# Patient Record
Sex: FEMALE | Race: WHITE | NOT HISPANIC OR LATINO | Employment: OTHER | URBAN - METROPOLITAN AREA
[De-identification: names, ages, dates, MRNs, and addresses within clinical notes are randomized per-mention and may not be internally consistent; named-entity substitution may affect disease eponyms.]

---

## 2018-09-18 ENCOUNTER — OFFICE VISIT (OUTPATIENT)
Dept: ORTHOPEDIC SURGERY | Facility: CLINIC | Age: 73
End: 2018-09-18

## 2018-09-18 VITALS — HEIGHT: 63 IN | TEMPERATURE: 98.2 F | WEIGHT: 20.2 LBS | BODY MASS INDEX: 3.58 KG/M2

## 2018-09-18 DIAGNOSIS — M25.561 CHRONIC PAIN OF BOTH KNEES: Primary | ICD-10-CM

## 2018-09-18 DIAGNOSIS — G89.29 CHRONIC PAIN OF BOTH KNEES: Primary | ICD-10-CM

## 2018-09-18 DIAGNOSIS — M17.11 PRIMARY OSTEOARTHRITIS OF RIGHT KNEE: ICD-10-CM

## 2018-09-18 DIAGNOSIS — M25.562 CHRONIC PAIN OF BOTH KNEES: Primary | ICD-10-CM

## 2018-09-18 PROCEDURE — 73562 X-RAY EXAM OF KNEE 3: CPT | Performed by: ORTHOPAEDIC SURGERY

## 2018-09-18 PROCEDURE — 99204 OFFICE O/P NEW MOD 45 MIN: CPT | Performed by: ORTHOPAEDIC SURGERY

## 2018-09-18 RX ORDER — CEFAZOLIN SODIUM 2 G/100ML
2 INJECTION, SOLUTION INTRAVENOUS ONCE
Status: CANCELLED | OUTPATIENT
Start: 2018-11-12 | End: 2018-11-12

## 2018-09-18 RX ORDER — LETROZOLE 2.5 MG/1
TABLET, FILM COATED ORAL
COMMUNITY
Start: 2018-09-13 | End: 2018-11-01

## 2018-09-18 RX ORDER — MELATONIN
1000 DAILY
COMMUNITY
End: 2018-11-13 | Stop reason: HOSPADM

## 2018-09-18 RX ORDER — VALSARTAN AND HYDROCHLOROTHIAZIDE 80; 12.5 MG/1; MG/1
1 TABLET, FILM COATED ORAL DAILY
COMMUNITY
Start: 2018-08-30

## 2018-09-18 RX ORDER — ACETAMINOPHEN 500 MG
1000 TABLET ORAL EVERY 6 HOURS PRN
COMMUNITY

## 2018-09-18 RX ORDER — PREGABALIN 75 MG/1
150 CAPSULE ORAL ONCE
Status: CANCELLED | OUTPATIENT
Start: 2018-11-12 | End: 2018-11-12

## 2018-09-18 RX ORDER — MELOXICAM 15 MG/1
15 TABLET ORAL ONCE
Status: CANCELLED | OUTPATIENT
Start: 2018-11-12 | End: 2018-11-12

## 2018-09-18 RX ORDER — ROSUVASTATIN CALCIUM 10 MG/1
TABLET, COATED ORAL
COMMUNITY
Start: 2018-04-25 | End: 2018-11-01

## 2018-09-18 RX ORDER — ASPIRIN 81 MG/1
81 TABLET ORAL DAILY
COMMUNITY
End: 2018-11-13 | Stop reason: HOSPADM

## 2018-09-18 NOTE — PROGRESS NOTES
Patient: Komal Sheffield  YOB: 1945 72 y.o. female  Medical Record Number: [id number]    Chief Complaints:   Chief Complaint   Patient presents with   • Left Knee - Establish Care, Pain   • Right Knee - Establish Care, Pain       History of Present Illness:Komal Sheffield is a 72 y.o. female who presents with right greater than left knee pain which is severe in nature.  She describes a stabbing aching pain on the medial joint has been present for 5 years.  She has seen Dr. Guthrie for treatment of her knees up until now.  She is undergone multiple injections, physical therapy, anti-inflammatories without relief of symptoms.  She has pain which limits her basic activities of daily living and has a very short walking tolerance.    Allergies:   Allergies   Allergen Reactions   • Cholestatin Unknown (See Comments)     Pt is unsure of reaction, dr told her to never eat it.   • Ciprofloxacin Itching   • Neomycin Itching   • Nsaids Unknown (See Comments)     High cr       Medications:   Current Outpatient Prescriptions   Medication Sig Dispense Refill   • acetaminophen (TYLENOL) 500 MG tablet Take 500 mg by mouth Every 6 (Six) Hours As Needed for Mild Pain .     • aspirin 81 MG EC tablet Take 81 mg by mouth Daily.     • cholecalciferol (VITAMIN D3) 1000 units tablet Take 1,000 Units by mouth Daily.     • letrozole (FEMARA) 2.5 MG tablet TAKE 1 TABLET BY MOUTH DAILY     • metFORMIN (GLUCOPHAGE) 1000 MG tablet      • rosuvastatin (CRESTOR) 10 MG tablet TAKE 1 TABLET BY MOUTH EVERY DAY     • sertraline (ZOLOFT) 50 MG tablet TAKE 1 TABLET BY MOUTH EVERY DAY     • valsartan-hydrochlorothiazide (DIOVAN-HCT) 80-12.5 MG per tablet      • sertraline (ZOLOFT) 50 MG tablet        No current facility-administered medications for this visit.          The following portions of the patient's history were reviewed and updated as appropriate: allergies, current medications, past family history, past medical history, past social  "history, past surgical history and problem list.    Review of Systems:   A 14 point review of systems was performed. All systems negative except pertinent positives/negative listed in HPI above    Physical Exam:   Vitals:    09/18/18 1533   Temp: 98.2 °F (36.8 °C)   TempSrc: Temporal Artery    Weight: 9.163 kg (20 lb 3.2 oz)   Height: 160 cm (63\")   PainSc: 10-Worst pain ever  Comment: walking   PainLoc: Knee  Comment: bilateral       General: A and O x 3, ASA, NAD    SCLERA:    Normal    DENTITION:   Normal  Knee:  right    ALIGNMENT:     Varus  ,   Patella  tracks  midline    GAIT:    Antalgic    SKIN:    No abnormality    RANGE OF MOTION:   7  -  120   DEG    STRENGTH:   4  / 5    LIGAMENTS:    No varus / valgus instability.   Negative  Lachman.    MENISCUS:     Negative   Tiki       DISTAL PULSES:    Paplable    DISTAL SENSATION :   Intact    LYMPHATICS:     No   lymphadenopathy    OTHER:          - Positive   effusion      - Crepitance with ROM          Radiology:  Xrays 3views both knees (ap,lateral, sunrise) were ordered and reviewed for evaluation of knee pain demonstrating advanced varus osteoarthritis with bone on bone articulation, subchondral cysts, and periarticular osteophytes. There are no previous films for comparision.    Assessment/Plan: Right greater than left knee end-stage advanced osteoarthritis.  She has failed the full complement of conservative measures.  Continuation of conservative management vs. TKA discussed.  The patient wishes to proceed with total knee replacement.  At this point the patient has failed the full compliment of conservative treatment and stating complete understanding of the risks/benefits/ anternatives wishes to proceed with surgical treatment.    Risk and benefits of surgery were reviewed.  Including, but not limited to, blood clots or pulmonary embolism, anesthesia risk, infection, fracture, skin/leg numbness, persistent pain/crepitance/popping/catching, failure " of the implant, need for future surgeries, hematoma, possible nerve or blood vessel injury, need for transfusion, and potential risk of stroke,heart attack or death, among others.  The patient understands and wishes to proceed.     It was explained that if tissue has been repaired or reconstructed, there is also an increased chance of failure which may require further management.  Following the completion of the discussion, the patient expressed understanding of this planned course of care, all their questions were answered and consent will be obtained preoperatively.    Operative Plan: right [employer] Total Knee Replacement an overnight staywith home health rehab    BMI 35      Valerio Barakat MD  9/18/2018

## 2018-09-27 PROBLEM — M17.11 PRIMARY OSTEOARTHRITIS OF RIGHT KNEE: Status: ACTIVE | Noted: 2018-09-27

## 2018-09-28 ENCOUNTER — TREATMENT (OUTPATIENT)
Dept: PHYSICAL THERAPY | Facility: CLINIC | Age: 73
End: 2018-09-28

## 2018-09-28 DIAGNOSIS — R26.2 DIFFICULTY WALKING: ICD-10-CM

## 2018-09-28 DIAGNOSIS — M17.11 PRIMARY OSTEOARTHRITIS OF RIGHT KNEE: Primary | ICD-10-CM

## 2018-09-28 PROCEDURE — G8979 MOBILITY GOAL STATUS: HCPCS | Performed by: PHYSICAL THERAPIST

## 2018-09-28 PROCEDURE — 97110 THERAPEUTIC EXERCISES: CPT | Performed by: PHYSICAL THERAPIST

## 2018-09-28 PROCEDURE — G8978 MOBILITY CURRENT STATUS: HCPCS | Performed by: PHYSICAL THERAPIST

## 2018-09-28 PROCEDURE — 97161 PT EVAL LOW COMPLEX 20 MIN: CPT | Performed by: PHYSICAL THERAPIST

## 2018-09-28 NOTE — PROGRESS NOTES
Physical Therapy Initial Evaluation and Plan of Care    Patient: Komal Sheffield   : 1945  Diagnosis/ICD-10 Code:  Primary osteoarthritis of right knee [M17.11]  Referring practitioner: Valerio Barakat MD    Subjective Evaluation    History of Present Illness  Onset date: 3 years ago, but increased in the last year.  Mechanism of injury: Pt is a 74 y/o WF who reports to the clinic with c/o B knee pain R >L for approximately 3 years.  Pt has received cortisone and gel injections with the most recent one year ago.  Pt reports she has the most discomfort when she is walking.  Pt reports she has had progressive knee pain for the last year and finally decided to have the TKR.     Subjective comment: Pt is scheduled to have her right knee replaced on 2018Quality of life: good    Pain  Current pain ratin  At worst pain ratin  Location: right medial knee joint   Quality: burning  Relieving factors: medications, change in position, rest and support (sitting, Tylenol)  Aggravating factors: ambulation and standing    Hand dominance: right    Diagnostic Tests  X-ray: abnormal (bone on bone right knee )    Treatments  Previous treatment: injection treatment and medication  Current treatment: medication  Patient Goals  Patient goals for therapy: decreased pain, increased strength and increased motion  Patient goal: increase strength prior to TKR on 2018           Objective       Palpation     Additional Palpation Details  Right lateral and medial knee joint line, right proximal medial tibia     Neurological Testing     Sensation     Knee   Left Knee   Intact: light touch    Right Knee   Intact: light touch     Active Range of Motion   Left Knee   Flexion: 120 degrees   Extension: 12 degrees     Right Knee   Flexion: 108 degrees   Extension: 12 degrees     Patellar Mobility   Left Knee Hypomobile in the left medial, left lateral, left superior and left inferior patellar tendon(s).     Right Knee  Hypomobile in the medial, lateral, superior and inferior patellar tendon(s).     Strength/Myotome Testing     Left Hip   Planes of Motion   Flexion: 5  Abduction: 5  Adduction: 4    Right Hip   Planes of Motion   Flexion: 4  Abduction: 4+  Adduction: 3+    Left Knee   Flexion: 4+  Extension: 5    Right Knee   Flexion: 4+  Extension: 5    Tests     Left Hip   90/90 SLR: Positive.   SLR: Positive.     Right Hip   90/90 SLR: Positive.   SLR: Positive.     Ambulation     Observational Gait   Gait: within functional limits   Decreased walking speed and stride length.     Additional Observational Gait Details  Pt enters department WBAT right LE without an assistive device moderate antalgic gait right LE          Assessment & Plan     Assessment  Impairments: abnormal gait, abnormal or restricted ROM, impaired balance, impaired physical strength, lacks appropriate home exercise program, pain with function and weight-bearing intolerance  Assessment details: Pt is a 74 y/o WF who reports to the clinic with c/o right knee pain, decreased flexibility, tenderness and decreased functional mobility secondary to OA right knee making it difficult to ambulate.        Prognosis: good  Functional Limitations: walking, uncomfortable because of pain, standing and stooping  Goals  Plan Goals: STGs: 2-3 weeks  1. Decrease right knee pain 5/10 with performing her ADLs.    2.  Pt is independent with HEP  3.  Increase B hamstring flexibility to WNL with SLR test  4.  Increase right hip strength to 4-4+/5              Plan  Therapy options: will be seen for skilled physical therapy services  Planned modality interventions: cryotherapy, electrical stimulation/Russian stimulation, thermotherapy (hydrocollator packs) and ultrasound  Planned therapy interventions: home exercise program, flexibility, strengthening, stretching, functional ROM exercises and therapeutic activities  Duration in visits: 3  Treatment plan discussed with:  patient        Manual Therapy:         mins  90762;  Therapeutic Exercise:   30      mins  50899;     Neuromuscular Fina:        mins  09672;    Therapeutic Activity:          mins  18291;     Gait Training:           mins  34155;     Ultrasound:          mins  94195;    Electrical Stimulation:         mins  26327 ( );  Dry Needling          mins self-pay    Timed Treatment:  30    mins   Total Treatment:     60   mins    PT SIGNATURE: Alondra Burris, PT   DATE TREATMENT INITIATED: 9/28/2018    Medicare Initial Certification  Certification Period: 12/27/2018  I certify that the therapy services are furnished while this patient is under my care.  The services outlined above are required by this patient, and will be reviewed every 90 days.     PHYSICIAN: Valerio Barakat MD      DATE:     Please sign and return via fax to [phone].. Thank you, [facility].

## 2018-10-05 ENCOUNTER — TREATMENT (OUTPATIENT)
Dept: PHYSICAL THERAPY | Facility: CLINIC | Age: 73
End: 2018-10-05

## 2018-10-05 DIAGNOSIS — M17.11 PRIMARY OSTEOARTHRITIS OF RIGHT KNEE: Primary | ICD-10-CM

## 2018-10-05 DIAGNOSIS — R26.2 DIFFICULTY WALKING: ICD-10-CM

## 2018-10-05 PROCEDURE — 97530 THERAPEUTIC ACTIVITIES: CPT | Performed by: PHYSICAL THERAPIST

## 2018-10-05 PROCEDURE — 97110 THERAPEUTIC EXERCISES: CPT | Performed by: PHYSICAL THERAPIST

## 2018-10-05 NOTE — PROGRESS NOTES
Physical Therapy Daily Progress Note    Time In 14:32  Time Out 15:30    Visit # : 2  Komal Sheffield reports: knee is sore but doing okay.    Subjective     Objective   See Exercise, Manual, and Modality Logs for complete treatment.       Assessment & Plan     Assessment  Assessment details: Pt is doing pretty well with therapy.  Pt was able to increase her reps and add new strengthening exercises without difficulty.  Modified left sidelying hip add secondary to right hip and LBP.  Will continue to see 1-2x/week for stretching, strengthening, and modalities PRN.          Progress per Plan of Care           Manual Therapy:         mins  36087;  Therapeutic Exercise:    30     mins  51778;     Neuromuscular Fina:        mins  33929;    Therapeutic Activity:    10      mins  95152;     Gait Training:           mins  36513;     Ultrasound:          mins  33761;    Electrical Stimulation:         mins  56712 ( );  Dry Needling          mins self-pay    Timed Treatment:  40    mins   Total Treatment:    58    mins    Alondra Burris, PT  Physical Therapist

## 2018-10-10 ENCOUNTER — TREATMENT (OUTPATIENT)
Dept: PHYSICAL THERAPY | Facility: CLINIC | Age: 73
End: 2018-10-10

## 2018-10-10 DIAGNOSIS — M17.11 PRIMARY OSTEOARTHRITIS OF RIGHT KNEE: Primary | ICD-10-CM

## 2018-10-10 DIAGNOSIS — R26.2 DIFFICULTY WALKING: ICD-10-CM

## 2018-10-10 PROCEDURE — 97530 THERAPEUTIC ACTIVITIES: CPT | Performed by: PHYSICAL THERAPIST

## 2018-10-10 PROCEDURE — 97110 THERAPEUTIC EXERCISES: CPT | Performed by: PHYSICAL THERAPIST

## 2018-10-10 NOTE — PROGRESS NOTES
Physical Therapy Daily Progress Note    Time In 10:00  Time Out 10:57    Visit # : 3  Komal Sheffield reports: her knee has been hurting for a couple of days, little more than normal and not sure why?      Subjective     Objective   See Exercise, Manual, and Modality Logs for complete treatment.       Assessment & Plan     Assessment  Assessment details: Pt is doing pretty well with therapy.  Did not increase pt's exercises today secondary to pt having some increased B knee pain over the last few days.   Will continue to see 1-2x/week for stretching, strengthening, and modalities PRN.          Progress per Plan of Care           Manual Therapy:         mins  78287;  Therapeutic Exercise:   30      mins  24836;     Neuromuscular Fina:        mins  60086;    Therapeutic Activity:     10     mins  60381;     Gait Training:           mins  50594;     Ultrasound:          mins  30973;    Electrical Stimulation:         mins  23390 ( );  Dry Needling          mins self-pay    Timed Treatment:  40    mins   Total Treatment:     57   mins    Alondra Burris, PT  Physical Therapist

## 2018-10-17 ENCOUNTER — TREATMENT (OUTPATIENT)
Dept: PHYSICAL THERAPY | Facility: CLINIC | Age: 73
End: 2018-10-17

## 2018-10-17 DIAGNOSIS — R26.2 DIFFICULTY WALKING: ICD-10-CM

## 2018-10-17 DIAGNOSIS — M17.11 PRIMARY OSTEOARTHRITIS OF RIGHT KNEE: Primary | ICD-10-CM

## 2018-10-17 PROCEDURE — 97035 APP MDLTY 1+ULTRASOUND EA 15: CPT | Performed by: PHYSICAL THERAPIST

## 2018-10-17 PROCEDURE — 97110 THERAPEUTIC EXERCISES: CPT | Performed by: PHYSICAL THERAPIST

## 2018-10-17 NOTE — PROGRESS NOTES
Physical Therapy Daily Progress Note    Visit # : 4  Komal Sheffield reports: her knees are really hurting.  States she has been doing her HEP everyday.  Pt c/o increased pain down the front of her shin.      Subjective     Objective       Tenderness     Right Knee   Tenderness in the pes anserinus.     Additional Tenderness Details  Pt denies any point tenderness over the left medial and lateral knee.    Pt with moderate tenderness over the right pes and posterior tib     See Exercise, Manual, and Modality Logs for complete treatment.       Assessment & Plan     Assessment  Assessment details: Pt is doing pretty well with therapy.  Did not increase pt's exercises today secondary to pt having some increased B knee pain over the last few days. Added US treatment to the right medial knee joint for the increased pain and tenderness.  Pt reported less pain after treatment.  Pt going to do HEP for one week then will follow up on 10/31/2018.           Progress per Plan of Care           Manual Therapy:         mins  96082;  Therapeutic Exercise:   30      mins  84776;     Neuromuscular Fina:        mins  37401;    Therapeutic Activity:          mins  98338;     Gait Training:           mins  17206;     Ultrasound:    8      mins  01397;    Electrical Stimulation:         mins  39717 ( );  Dry Needling          mins self-pay    Timed Treatment:   38   mins   Total Treatment:    50    mins    Alondra Burris, PT  Physical Therapist

## 2018-10-31 ENCOUNTER — TREATMENT (OUTPATIENT)
Dept: PHYSICAL THERAPY | Facility: CLINIC | Age: 73
End: 2018-10-31

## 2018-10-31 DIAGNOSIS — R26.2 DIFFICULTY WALKING: ICD-10-CM

## 2018-10-31 DIAGNOSIS — M17.11 PRIMARY OSTEOARTHRITIS OF RIGHT KNEE: Primary | ICD-10-CM

## 2018-10-31 PROCEDURE — G8979 MOBILITY GOAL STATUS: HCPCS | Performed by: PHYSICAL THERAPIST

## 2018-10-31 PROCEDURE — G8980 MOBILITY D/C STATUS: HCPCS | Performed by: PHYSICAL THERAPIST

## 2018-10-31 PROCEDURE — 97110 THERAPEUTIC EXERCISES: CPT | Performed by: PHYSICAL THERAPIST

## 2018-10-31 NOTE — PROGRESS NOTES
Physical Therapy Daily Progress Note    Visit # : 5  Komal Sheffield reports: her knee pain since she started PT has been a 3/10 on a daily bases. Pt states she feels stronger.      Subjective     Objective       Tenderness     Additional Tenderness Details  Pt denies having any increased tenderness over the right medial knee joint.      Strength/Myotome Testing     Right Hip   Planes of Motion   Flexion: 5  Abduction: 5  Adduction: 4+    Left Knee   Flexion: 5  Extension: 5    Right Knee   Flexion: 5  Extension: 5    Tests     Left Hip   90/90 SLR: Negative.   SLR: Negative.     Right Hip   90/90 SLR: Negative.  SLR: Negative.      See Exercise, Manual, and Modality Logs for complete treatment.       Assessment & Plan     Assessment  Assessment details: Pt is doing pretty well with therapy.  Pt has accomplished all STGs and feel pt can continue managing symptoms with HEP prior to having her TKR on 11/12/2018.  Discharging pt at this time until pt calls to reschedule her outpatient PT after her TKR.          Other           Manual Therapy:         mins  01760;  Therapeutic Exercise:   35      mins  52900;     Neuromuscular Fina:        mins  75554;    Therapeutic Activity:          mins  28505;     Gait Training:           mins  69683;     Ultrasound:          mins  56274;    Electrical Stimulation:         mins  41345 ( );  Dry Needling          mins self-pay    Timed Treatment:  35    mins   Total Treatment:     52   mins    Alondra Burris, PT  Physical Therapist

## 2018-11-01 ENCOUNTER — APPOINTMENT (OUTPATIENT)
Dept: PREADMISSION TESTING | Facility: HOSPITAL | Age: 73
End: 2018-11-01

## 2018-11-01 VITALS
OXYGEN SATURATION: 95 % | DIASTOLIC BLOOD PRESSURE: 75 MMHG | HEART RATE: 109 BPM | BODY MASS INDEX: 36.14 KG/M2 | RESPIRATION RATE: 20 BRPM | HEIGHT: 63 IN | SYSTOLIC BLOOD PRESSURE: 117 MMHG | TEMPERATURE: 98 F | WEIGHT: 204 LBS

## 2018-11-01 DIAGNOSIS — M17.11 PRIMARY OSTEOARTHRITIS OF RIGHT KNEE: ICD-10-CM

## 2018-11-01 LAB
ANION GAP SERPL CALCULATED.3IONS-SCNC: 15.4 MMOL/L
BACTERIA UR QL AUTO: ABNORMAL /HPF
BILIRUB UR QL STRIP: NEGATIVE
BUN BLD-MCNC: 17 MG/DL (ref 8–23)
BUN/CREAT SERPL: 16.2 (ref 7–25)
CALCIUM SPEC-SCNC: 9.4 MG/DL (ref 8.6–10.5)
CHLORIDE SERPL-SCNC: 99 MMOL/L (ref 98–107)
CLARITY UR: CLEAR
CO2 SERPL-SCNC: 22.6 MMOL/L (ref 22–29)
COD CRY URNS QL: ABNORMAL /HPF
COLOR UR: YELLOW
CREAT BLD-MCNC: 1.05 MG/DL (ref 0.57–1)
DEPRECATED RDW RBC AUTO: 44.7 FL (ref 37–54)
ERYTHROCYTE [DISTWIDTH] IN BLOOD BY AUTOMATED COUNT: 13.9 % (ref 11.7–13)
GFR SERPL CREATININE-BSD FRML MDRD: 52 ML/MIN/1.73
GLUCOSE BLD-MCNC: 178 MG/DL (ref 65–99)
GLUCOSE UR STRIP-MCNC: NEGATIVE MG/DL
HCT VFR BLD AUTO: 39.8 % (ref 35.6–45.5)
HGB BLD-MCNC: 12.1 G/DL (ref 11.9–15.5)
HGB UR QL STRIP.AUTO: ABNORMAL
HYALINE CASTS UR QL AUTO: ABNORMAL /LPF
KETONES UR QL STRIP: ABNORMAL
LEUKOCYTE ESTERASE UR QL STRIP.AUTO: ABNORMAL
MCH RBC QN AUTO: 27.1 PG (ref 26.9–32)
MCHC RBC AUTO-ENTMCNC: 30.4 G/DL (ref 32.4–36.3)
MCV RBC AUTO: 89 FL (ref 80.5–98.2)
NITRITE UR QL STRIP: NEGATIVE
PH UR STRIP.AUTO: <=5 [PH] (ref 5–8)
PLATELET # BLD AUTO: 351 10*3/MM3 (ref 140–500)
PMV BLD AUTO: 9.9 FL (ref 6–12)
POTASSIUM BLD-SCNC: 3.5 MMOL/L (ref 3.5–5.2)
PROT UR QL STRIP: ABNORMAL
RBC # BLD AUTO: 4.47 10*6/MM3 (ref 3.9–5.2)
RBC # UR: ABNORMAL /HPF
REF LAB TEST METHOD: ABNORMAL
SODIUM BLD-SCNC: 137 MMOL/L (ref 136–145)
SP GR UR STRIP: >=1.03 (ref 1–1.03)
SQUAMOUS #/AREA URNS HPF: ABNORMAL /HPF
UROBILINOGEN UR QL STRIP: ABNORMAL
WBC CLUMPS # UR AUTO: ABNORMAL /HPF
WBC NRBC COR # BLD: 6.37 10*3/MM3 (ref 4.5–10.7)
WBC UR QL AUTO: ABNORMAL /HPF

## 2018-11-01 PROCEDURE — 93010 ELECTROCARDIOGRAM REPORT: CPT | Performed by: INTERNAL MEDICINE

## 2018-11-01 PROCEDURE — 63710000001 MUPIROCIN 2 % OINTMENT: Performed by: ORTHOPAEDIC SURGERY

## 2018-11-01 PROCEDURE — 36415 COLL VENOUS BLD VENIPUNCTURE: CPT

## 2018-11-01 PROCEDURE — 80048 BASIC METABOLIC PNL TOTAL CA: CPT | Performed by: ORTHOPAEDIC SURGERY

## 2018-11-01 PROCEDURE — A9270 NON-COVERED ITEM OR SERVICE: HCPCS | Performed by: ORTHOPAEDIC SURGERY

## 2018-11-01 PROCEDURE — 87086 URINE CULTURE/COLONY COUNT: CPT | Performed by: ORTHOPAEDIC SURGERY

## 2018-11-01 PROCEDURE — 85027 COMPLETE CBC AUTOMATED: CPT | Performed by: ORTHOPAEDIC SURGERY

## 2018-11-01 PROCEDURE — 81001 URINALYSIS AUTO W/SCOPE: CPT | Performed by: ORTHOPAEDIC SURGERY

## 2018-11-01 PROCEDURE — 93005 ELECTROCARDIOGRAM TRACING: CPT

## 2018-11-01 RX ORDER — CETIRIZINE HYDROCHLORIDE 10 MG/1
10 TABLET ORAL NIGHTLY
COMMUNITY

## 2018-11-01 RX ORDER — FLUTICASONE PROPIONATE 50 MCG
2 SPRAY, SUSPENSION (ML) NASAL EVERY MORNING
COMMUNITY

## 2018-11-01 RX ORDER — LETROZOLE 2.5 MG/1
2.5 TABLET, FILM COATED ORAL NIGHTLY
COMMUNITY
End: 2022-04-26

## 2018-11-01 RX ORDER — ROSUVASTATIN CALCIUM 10 MG/1
10 TABLET, COATED ORAL DAILY
COMMUNITY
End: 2021-04-08

## 2018-11-01 ASSESSMENT — KOOS JR
KOOS JR SCORE: 61.583
KOOS JR SCORE: 10

## 2018-11-01 NOTE — DISCHARGE INSTRUCTIONS
Take the following medications the morning of surgery with a small sip of water:DIOVAN    Arrive at 7am    General Instructions:  • Do not eat solid food after midnight the night before surgery.  • You may drink clear liquids day of surgery but must stop at least one hour before your hospital arrival time.  • It is beneficial for you to have a clear drink that contains carbohydrates the day of surgery.  We suggest a 12 to 20 ounce bottle of Gatorade or Powerade for non-diabetic patients or a 12 to 20 ounce bottle of G2 or Powerade Zero for diabetic patients. (Pediatric patients, are not advised to drink a 12 to 20 ounce carbohydrate drink)    Clear liquids are liquids you can see through.  Nothing red in color.     Plain water                               Sports drinks  Sodas                                   Gelatin (Jell-O)  Fruit juices without pulp such as white grape juice and apple juice  Popsicles that contain no fruit or yogurt  Tea or coffee (no cream or milk added)  Gatorade / Powerade  G2 / Powerade Zero    • Infants may have breast milk up to four hours before surgery.  • Infants drinking formula may drink formula up to six hours before surgery.   • Patients who avoid smoking, chewing tobacco and alcohol for 4 weeks prior to surgery have a reduced risk of post-operative complications.  Quit smoking as many days before surgery as you can.  • Do not smoke, use chewing tobacco or drink alcohol the day of surgery.   • If applicable bring your C-PAP/ BI-PAP machine.  • Bring any papers given to you in the doctor’s office.  • Wear clean comfortable clothes and socks.  • Do not wear contact lenses or make-up.  Bring a case for your glasses.   • Bring crutches or walker if applicable.  • Remove all piercings.  Leave jewelry and any other valuables at home.  • Hair extensions with metal clips must be removed prior to surgery.  • The Pre-Admission Testing nurse will instruct you to bring medications if unable to  obtain an accurate list in Pre-Admission Testing.        If you were given a blood bank ID arm band remember to bring it with you the day of surgery.    Preventing a Surgical Site Infection:  • For 2 to 3 days before surgery, avoid shaving with a razor because the razor can irritate skin and make it easier to develop an infection.    • Any areas of open skin can increase the risk of a post-operative wound infection by allowing bacteria to enter and travel throughout the body.  Notify your surgeon if you have any skin wounds / rashes even if it is not near the expected surgical site.  The area will need assessed to determine if surgery should be delayed until it is healed.  • The night prior to surgery sleep in a clean bed with clean clothing.  Do not allow pets to sleep with you.  • Shower on the morning of surgery using a fresh bar of anti-bacterial soap (such as Dial) and clean washcloth.  Dry with a clean towel and dress in clean clothing.  • Ask your surgeon if you will be receiving antibiotics prior to surgery.  • Make sure you, your family, and all healthcare providers clean their hands with soap and water or an alcohol based hand  before caring for you or your wound.    Day of surgery:  Upon arrival, a Pre-op nurse and Anesthesiologist will review your health history, obtain vital signs, and answer questions you may have.  The only belongings needed at this time will be your home medications and if applicable your C-PAP/BI-PAP machine.  If you are staying overnight your family can leave the rest of your belongings in the car and bring them to your room later.  A Pre-op nurse will start an IV and you may receive medication in preparation for surgery, including something to help you relax.  Your family will be able to see you in the Pre-op area.  While you are in surgery your family should notify the waiting room  if they leave the waiting room area and provide a contact phone  number.    Please be aware that surgery does come with discomfort.  We want to make every effort to control your discomfort so please discuss any uncontrolled symptoms with your nurse.   Your doctor will most likely have prescribed pain medications.      If you are going home after surgery you will receive individualized written care instructions before being discharged.  A responsible adult must drive you to and from the hospital on the day of your surgery and stay with you for 24 hours.    If you are staying overnight following surgery, you will be transported to your hospital room following the recovery period.  [facility] has all private rooms.    You have received a list of surgical assistants for your reference.  If you have any questions please call Pre-Admission Testing at [phone].  Deductibles and co-payments are collected on the day of service. Please be prepared to pay the required co-pay, deductible or deposit on the day of service as defined by your plan.    2% CHLORAHEXIDINE GLUCONATE* CLOTH  Preparing or “prepping” skin before surgery can reduce the risk of infection at the surgical site. To make the process easier, [facility] has chosen disposable cloths moistened with a rinse-free, 2% Chlorhexidine Gluconate (CHG) antiseptic solution. The steps below outline the prepping process and should be carefully followed.        Use the prep cloth on the area that is circled in the diagram             Directions Night before Surgery  1) Shower using a fresh bar of anti-bacterial soap (such as Dial) and clean washcloth.  Use a clean towel to completely dry your skin.  2) Do not use any lotions, oils or creams on your skin.  3) Open the package and remove 1 cloth, wipe your skin for 30 seconds in a circular motion.  Allow to dry for 3 minutes.  4) Repeat #3 with second cloth.  5) Do not touch your eyes, ears, or mouth with the prep cloth.  6) Allow the wet prep solution to air  dry.  7) Discard the prep cloth and wash your hands with soap and water.   8) Dress in clean bed clothes and sleep on fresh clean bed sheets.   9) You may experience some temporary itching after the prep.    Directions Day of Surgery  1) Repeat steps 1,2,3,4,5,6,7, and 9.   2) Dress in clean clothes before coming to the hospital.    BACTROBAN NASAL OINTMENT  There are many germs normally in your nose. Bactroban is an ointment that will help reduce these germs. Please follow these instructions for Bactroban use:      ____The day before surgery in the morning  Date________    ____The day before surgery in the evening              Date________    ____The day of surgery in the morning    Date________    **Squirt ½ package of Bactroban Ointment onto a cotton applicator and apply to inside of 1st nostril.  Squirt the remaining Bactroban and apply to the inside of the other nostril.    PERIDEX- ORAL:  Use only if your surgeon has ordered  Use the night before and morning of surgery - Swish, gargle, and spit - do not swallow.

## 2018-11-02 LAB — BACTERIA SPEC AEROBE CULT: NO GROWTH

## 2018-11-06 NOTE — PROGRESS NOTES
Pre-Operative Orthopedic Assessment      Patient: Komal Sheffield    YOB: 1945      Age/Gender: 73 y.o. female  Medical Record Number: [id number]  Surgical Procedure Planned: RT TOTAL KNEE ARTHROPLASTY     Surgeon: Valerio Barakat MD    Date of Surgery Planned: 11/12/2018    Question Value Score    Patient Score   1. What is your age group? 50-65 years  66-75 years  >75 years =2  =1  =0 1   2. Gender? Male  Female =2  =1 1   3. How far on average can you walk? (a block is 200 meters) Two blocks or more (+\- rest)  1-2 blocks (+\- rest)  Housebound (most of time) =2  =1  =0 0   4. Which gait aid to you use? (more often than not) None  Single-Point Stick  Crutches/Frame =2  =1  =0 2   5. Do you use community  supports? (home help, meals on wheels, district nursing) None or one per week  Two or more per week =1  =0 1   6. Will you live with someone who can care for you after your operation? Yes  No =3  =0 3      Your Score (out of 12)  8     Key Destination at Discharge from acute care predicted by score   Scores < 6  -- extended inpatient rehabilitation   Scores 6-9 -- additional intervention to discharge directly home (Rehabilitation in home)   Scores > 9 -- directly home         Discharge Disposition/Planning:     Patient Response   Discussed the Predicted discharge disposition needed based on RAPT Assessment with the patient.    yes   Patient selected discharge disposition:   home   Out Patient Rehabilitation Facility of Choice:    none   Home Health Services Preferred:   none   Has the patient completed or been scheduled to complete pre-operative testing? yes   Post-Operative Care Giver Name and Phone Number:    Spouse Damien Molina  [phone]     Subacute Inpatient Rehabilitation:  Complete this section only if planning inpatient services at a Subacute Facility     Patient Response   Subacute Facility Preferred (Please list 2 facilities:      Requires pre-certification for inpatient  rehabilitation services?         Planned source of transportation to inpatient rehabilitation facility?       If choosing inpatient services at an Acute or Subacute Facility please list a subsequent back-up plan (in case patient fails to qualify for inpatient rehabilitation). Back-up plans should include caregiver (family member or friend) for first 24-48 post- -operatively.       Home Equipment Patient Response   Does patient have a walker for home use?    no   Does patient have a 3 in 1 commode for home use?    no   Does patient have a shower chair for home use?    no   Does patient have an elevated commode seat for home use? yes   Does patient have a cane for home use?    yes   Is there any other medical equipment in the home? If so,  List in comment section below      Pre-Operative Class Attendance Patient Response   Attended or scheduled to attend the pre-operative class within 1 year of total joint replacement? yes   Not planning to attend the pre-operative class (see comments below)    attended   Patient Education  Completed   Expected time of discharge discussed yes   Encouraged to attend Pre-Operative Class    attended   Education re: Back-up plan for patients planning discharge to subacute facilities N/a   Education re: Predicted Discharge Disposition based on RAPT score    yes   Patient receptive and voiced understanding of information given    yes                                                                                                            Comments:    Address verified.  Patient resides with spouse and has just two steps to enter then everything is on one level.Patient plans home with HH and will need to review HH provider choice list.  Spouse will be able to assist.                                       Signature: Darcie Lozano RN    Date:  11/6/2018

## 2018-11-08 ENCOUNTER — OFFICE VISIT (OUTPATIENT)
Dept: ORTHOPEDIC SURGERY | Facility: CLINIC | Age: 73
End: 2018-11-08

## 2018-11-08 VITALS
DIASTOLIC BLOOD PRESSURE: 100 MMHG | HEIGHT: 62 IN | BODY MASS INDEX: 37.21 KG/M2 | WEIGHT: 202.2 LBS | SYSTOLIC BLOOD PRESSURE: 140 MMHG | TEMPERATURE: 97.7 F

## 2018-11-08 DIAGNOSIS — M17.11 PRIMARY OSTEOARTHRITIS OF RIGHT KNEE: Primary | ICD-10-CM

## 2018-11-08 PROCEDURE — S0260 H&P FOR SURGERY: HCPCS | Performed by: NURSE PRACTITIONER

## 2018-11-08 PROCEDURE — 77077 JOINT SURVEY SINGLE VIEW: CPT | Performed by: ORTHOPAEDIC SURGERY

## 2018-11-08 NOTE — H&P
Patient: Komal Sheffield    Date of Admission: 11/12/18    YOB: 1945    Medical Record Number: [id number]    Admitting Physician: Dr. Valerio Barakat    Reason for Admission: End Stage Right Knee OA    History of Present Illness: 73 y.o. female presents with severe end stage knee osteoarthritis which has not been responsive to the full compliment of conservative measures. Despite conservative attempts, there is still severe, constant activity limiting pain. Given the severity of the pain, the patient has elected to proceed with knee replacement.    Allergies:   Allergies   Allergen Reactions   • Ciprofloxacin Itching   • Neomycin Itching   • Bactrim [Sulfamethoxazole-Trimethoprim] Itching   • Lipitor [Atorvastatin] Itching   • Nsaids Unknown (See Comments)     High cr   • Shellfish-Derived Products Other (See Comments)     LOBSTER PER ALLERGIST         Current Medications:  Scheduled Meds:  PRN Meds:.    PMH:     Past Medical History:   Diagnosis Date   • Arthritis    • Cancer (CMS/HCC)     Breast CA 12/2016 with a mastectomy    • Depression    • Diabetes mellitus (CMS/HCC)    • Hyperlipidemia    • Hypertension    • Knee pain, bilateral    • Osteopenia        PF/Surg/Soc Hx:     Past Surgical History:   Procedure Laterality Date   • CHOLECYSTECTOMY     • HYSTERECTOMY     • KIDNEY STONE SURGERY     • LYMPH NODE DISSECTION Right    • MASTECTOMY Right    • TONSILLECTOMY          Social History     Occupational History   • Not on file.     Social History Main Topics   • Smoking status: Never Smoker   • Smokeless tobacco: Never Used   • Alcohol use No   • Drug use: No   • Sexual activity: Defer      Social History     Social History Narrative   • No narrative on file        Family History   Problem Relation Age of Onset   • Malig Hyperthermia Neg Hx          Review of Systems:   A 14 point review of systems was performed, pertinent positives discussed above, all other systems are negative    Physical Exam: 73 y.o.  "female  Vital Signs :   Vitals:    11/08/18 1353   BP: 140/100   BP Location: Left arm   Patient Position: Sitting   Cuff Size: Adult   Temp: 97.7 °F (36.5 °C)   TempSrc: Temporal Artery    Weight: 91.7 kg (202 lb 3.2 oz)   Height: 157.5 cm (62\")     General: Alert and Oriented x 3, No acute distress.  Psych: mood and affect appropriate; recent and remote memory intact  Eyes: conjunctiva clear; pupils equally round and reactive, sclera nonicteric  CV: no peripheral edema  Resp: normal respiratory effort  Skin: no rashes or wounds; normal turgor  Musculosketetal; pain and crepitance with knee range of motion  Vascular: palpable distal pulses    Xrays:  -3 views (AP, lateral, and sunrise) were reviewed demonstrating end-stage OA with bone on bone articulation.  -A full length AP xray was ordered today for purposes of operative alignment demonstrating end stage arthritic findings. There are no previous full length films for review    Assessment:  End-stage Right knee osteoarthritis. Conservative measures have failed.      Plan:  The plan is to proceed with Right Total Knee Replacement. The patient voiced understanding of the risks, benefits, and alternative forms of treatment that were discussed with Dr Barakat at the time of scheduling.  Patient is planning on 1 home with home health next day.  Creatinine is elevated so no anti-inflammatories.    Katina Dale, APRN  11/8/2018        "

## 2018-11-08 NOTE — H&P (VIEW-ONLY)
Patient: Komal Sheffield    Date of Admission: 11/12/18    YOB: 1945    Medical Record Number: [id number]    Admitting Physician: Dr. Valerio Barakat    Reason for Admission: End Stage Right Knee OA    History of Present Illness: 73 y.o. female presents with severe end stage knee osteoarthritis which has not been responsive to the full compliment of conservative measures. Despite conservative attempts, there is still severe, constant activity limiting pain. Given the severity of the pain, the patient has elected to proceed with knee replacement.    Allergies:   Allergies   Allergen Reactions   • Ciprofloxacin Itching   • Neomycin Itching   • Bactrim [Sulfamethoxazole-Trimethoprim] Itching   • Lipitor [Atorvastatin] Itching   • Nsaids Unknown (See Comments)     High cr   • Shellfish-Derived Products Other (See Comments)     LOBSTER PER ALLERGIST         Current Medications:  Scheduled Meds:  PRN Meds:.    PMH:     Past Medical History:   Diagnosis Date   • Arthritis    • Cancer (CMS/HCC)     Breast CA 12/2016 with a mastectomy    • Depression    • Diabetes mellitus (CMS/HCC)    • Hyperlipidemia    • Hypertension    • Knee pain, bilateral    • Osteopenia        PF/Surg/Soc Hx:     Past Surgical History:   Procedure Laterality Date   • CHOLECYSTECTOMY     • HYSTERECTOMY     • KIDNEY STONE SURGERY     • LYMPH NODE DISSECTION Right    • MASTECTOMY Right    • TONSILLECTOMY          Social History     Occupational History   • Not on file.     Social History Main Topics   • Smoking status: Never Smoker   • Smokeless tobacco: Never Used   • Alcohol use No   • Drug use: No   • Sexual activity: Defer      Social History     Social History Narrative   • No narrative on file        Family History   Problem Relation Age of Onset   • Malig Hyperthermia Neg Hx          Review of Systems:   A 14 point review of systems was performed, pertinent positives discussed above, all other systems are negative    Physical Exam: 73 y.o.  "female  Vital Signs :   Vitals:    11/08/18 1353   BP: 140/100   BP Location: Left arm   Patient Position: Sitting   Cuff Size: Adult   Temp: 97.7 °F (36.5 °C)   TempSrc: Temporal Artery    Weight: 91.7 kg (202 lb 3.2 oz)   Height: 157.5 cm (62\")     General: Alert and Oriented x 3, No acute distress.  Psych: mood and affect appropriate; recent and remote memory intact  Eyes: conjunctiva clear; pupils equally round and reactive, sclera nonicteric  CV: no peripheral edema  Resp: normal respiratory effort  Skin: no rashes or wounds; normal turgor  Musculosketetal; pain and crepitance with knee range of motion  Vascular: palpable distal pulses    Xrays:  -3 views (AP, lateral, and sunrise) were reviewed demonstrating end-stage OA with bone on bone articulation.  -A full length AP xray was ordered today for purposes of operative alignment demonstrating end stage arthritic findings. There are no previous full length films for review    Assessment:  End-stage Right knee osteoarthritis. Conservative measures have failed.      Plan:  The plan is to proceed with Right Total Knee Replacement. The patient voiced understanding of the risks, benefits, and alternative forms of treatment that were discussed with Dr Barakat at the time of scheduling.  Patient is planning on 1 home with home health next day.  Creatinine is elevated so no anti-inflammatories.    Katina Dale, APRN  11/8/2018        "

## 2018-11-12 ENCOUNTER — ANESTHESIA EVENT (OUTPATIENT)
Dept: PERIOP | Facility: HOSPITAL | Age: 73
End: 2018-11-12

## 2018-11-12 ENCOUNTER — HOSPITAL ENCOUNTER (OUTPATIENT)
Facility: HOSPITAL | Age: 73
Discharge: HOME HEALTH | End: 2018-11-13
Attending: ORTHOPAEDIC SURGERY | Admitting: ORTHOPAEDIC SURGERY

## 2018-11-12 ENCOUNTER — APPOINTMENT (OUTPATIENT)
Dept: GENERAL RADIOLOGY | Facility: HOSPITAL | Age: 73
End: 2018-11-12

## 2018-11-12 ENCOUNTER — ANESTHESIA (OUTPATIENT)
Dept: PERIOP | Facility: HOSPITAL | Age: 73
End: 2018-11-12

## 2018-11-12 DIAGNOSIS — R26.2 DIFFICULTY WALKING: Primary | ICD-10-CM

## 2018-11-12 DIAGNOSIS — M17.11 PRIMARY OSTEOARTHRITIS OF RIGHT KNEE: ICD-10-CM

## 2018-11-12 PROBLEM — G89.29 CHRONIC PAIN OF BOTH KNEES: Status: ACTIVE | Noted: 2018-11-12

## 2018-11-12 PROBLEM — M25.561 CHRONIC PAIN OF BOTH KNEES: Status: ACTIVE | Noted: 2018-11-12

## 2018-11-12 PROBLEM — M25.562 CHRONIC PAIN OF BOTH KNEES: Status: ACTIVE | Noted: 2018-11-12

## 2018-11-12 LAB
GLUCOSE BLDC GLUCOMTR-MCNC: 107 MG/DL (ref 70–130)
GLUCOSE BLDC GLUCOMTR-MCNC: 166 MG/DL (ref 70–130)
GLUCOSE BLDC GLUCOMTR-MCNC: 190 MG/DL (ref 70–130)

## 2018-11-12 PROCEDURE — 25010000002 FENTANYL CITRATE (PF) 100 MCG/2ML SOLUTION: Performed by: NURSE ANESTHETIST, CERTIFIED REGISTERED

## 2018-11-12 PROCEDURE — C1713 ANCHOR/SCREW BN/BN,TIS/BN: HCPCS | Performed by: ORTHOPAEDIC SURGERY

## 2018-11-12 PROCEDURE — 25010000002 HYDROMORPHONE PER 4 MG: Performed by: NURSE ANESTHETIST, CERTIFIED REGISTERED

## 2018-11-12 PROCEDURE — 25010000002 DEXAMETHASONE PER 1 MG: Performed by: NURSE ANESTHETIST, CERTIFIED REGISTERED

## 2018-11-12 PROCEDURE — 27447 TOTAL KNEE ARTHROPLASTY: CPT | Performed by: ORTHOPAEDIC SURGERY

## 2018-11-12 PROCEDURE — 97161 PT EVAL LOW COMPLEX 20 MIN: CPT

## 2018-11-12 PROCEDURE — G8978 MOBILITY CURRENT STATUS: HCPCS

## 2018-11-12 PROCEDURE — 27447 TOTAL KNEE ARTHROPLASTY: CPT | Performed by: NURSE PRACTITIONER

## 2018-11-12 PROCEDURE — 63710000001 METFORMIN 1000 MG TABLET: Performed by: NURSE PRACTITIONER

## 2018-11-12 PROCEDURE — A9270 NON-COVERED ITEM OR SERVICE: HCPCS | Performed by: NURSE PRACTITIONER

## 2018-11-12 PROCEDURE — 63710000001 HYDROCODONE-ACETAMINOPHEN 7.5-325 MG TABLET: Performed by: NURSE PRACTITIONER

## 2018-11-12 PROCEDURE — 63710000001 PREGABALIN 75 MG CAPSULE: Performed by: ORTHOPAEDIC SURGERY

## 2018-11-12 PROCEDURE — 82962 GLUCOSE BLOOD TEST: CPT

## 2018-11-12 PROCEDURE — 63710000001 POLYETHYLENE GLYCOL PACK: Performed by: NURSE PRACTITIONER

## 2018-11-12 PROCEDURE — G0378 HOSPITAL OBSERVATION PER HR: HCPCS

## 2018-11-12 PROCEDURE — A9270 NON-COVERED ITEM OR SERVICE: HCPCS | Performed by: ORTHOPAEDIC SURGERY

## 2018-11-12 PROCEDURE — 73560 X-RAY EXAM OF KNEE 1 OR 2: CPT

## 2018-11-12 PROCEDURE — 25010000002 VANCOMYCIN 10 G RECONSTITUTED SOLUTION: Performed by: ORTHOPAEDIC SURGERY

## 2018-11-12 PROCEDURE — 63710000001 MUPIROCIN 2 % OINTMENT: Performed by: NURSE PRACTITIONER

## 2018-11-12 PROCEDURE — 63710000001 DOCUSATE SODIUM 100 MG CAPSULE: Performed by: NURSE PRACTITIONER

## 2018-11-12 PROCEDURE — G8979 MOBILITY GOAL STATUS: HCPCS

## 2018-11-12 PROCEDURE — 63710000001 SERTRALINE 50 MG TABLET: Performed by: NURSE PRACTITIONER

## 2018-11-12 PROCEDURE — 25010000002 MIDAZOLAM PER 1 MG: Performed by: ANESTHESIOLOGY

## 2018-11-12 PROCEDURE — 63710000001 LETROZOLE 2.5 MG TABLET: Performed by: NURSE PRACTITIONER

## 2018-11-12 PROCEDURE — 25010000003 CEFAZOLIN IN DEXTROSE 2-4 GM/100ML-% SOLUTION: Performed by: ORTHOPAEDIC SURGERY

## 2018-11-12 PROCEDURE — C1776 JOINT DEVICE (IMPLANTABLE): HCPCS | Performed by: ORTHOPAEDIC SURGERY

## 2018-11-12 PROCEDURE — 97110 THERAPEUTIC EXERCISES: CPT

## 2018-11-12 PROCEDURE — 25010000002 ONDANSETRON PER 1 MG: Performed by: NURSE ANESTHETIST, CERTIFIED REGISTERED

## 2018-11-12 PROCEDURE — 25010000002 PROPOFOL 10 MG/ML EMULSION: Performed by: NURSE ANESTHETIST, CERTIFIED REGISTERED

## 2018-11-12 PROCEDURE — 25010000003 CEFAZOLIN IN DEXTROSE 2-4 GM/100ML-% SOLUTION: Performed by: NURSE PRACTITIONER

## 2018-11-12 DEVICE — GENESIS II POSTERIOR STABILIZED                                    HIGH FLEXION INSERT SIZE 3-4 9MM
Type: IMPLANTABLE DEVICE | Site: KNEE | Status: FUNCTIONAL
Brand: GENESIS II

## 2018-11-12 DEVICE — GENESIS II NON-POROUS TIBIAL                                    BASEPLATE SIZE 4 RIGHT
Type: IMPLANTABLE DEVICE | Site: KNEE | Status: FUNCTIONAL
Brand: GENESIS II

## 2018-11-12 DEVICE — PALACOS® R IS A FAST-CURING, RADIOPAQUE, POLY(METHYL METHACRYLATE)-BASED BONE CEMENT.PALACOS ® R CONTAINS THE X-RAY CONTRAST MEDIUM ZIRCONIUM DIOXIDE. TO IMPROVE VISIBILITY IN THE SURGICAL FIELD PALACOS ® R HAS BEEN COLOURED WITH CHLOROPHYLL (E141). THE BONE CEMENT IS PREPARED DIRECTLY BEFORE USE BY MIXING A POLYMER POWDER COMPONENT WITH A LIQUID MONOMER COMPONENT. A DUCTILE DOUGH FORMS WHICH CURES WITHIN A FEW MINUTES.
Type: IMPLANTABLE DEVICE | Site: KNEE | Status: FUNCTIONAL
Brand: PALACOS®

## 2018-11-12 DEVICE — IMPLANTABLE DEVICE: Type: IMPLANTABLE DEVICE | Site: KNEE | Status: FUNCTIONAL

## 2018-11-12 DEVICE — LEGION POSTERIOR STABILIZED                                    OXINIUM FEMORAL SIZE 5 RIGHT
Type: IMPLANTABLE DEVICE | Site: KNEE | Status: FUNCTIONAL
Brand: LEGION

## 2018-11-12 DEVICE — GENESIS II BICONVEX PATELLA 23MM
Type: IMPLANTABLE DEVICE | Site: KNEE | Status: FUNCTIONAL
Brand: GENESIS II

## 2018-11-12 RX ORDER — LIDOCAINE HYDROCHLORIDE 20 MG/ML
INJECTION, SOLUTION INFILTRATION; PERINEURAL AS NEEDED
Status: DISCONTINUED | OUTPATIENT
Start: 2018-11-12 | End: 2018-11-12 | Stop reason: SURG

## 2018-11-12 RX ORDER — ONDANSETRON 2 MG/ML
4 INJECTION INTRAMUSCULAR; INTRAVENOUS ONCE AS NEEDED
Status: COMPLETED | OUTPATIENT
Start: 2018-11-12 | End: 2018-11-12

## 2018-11-12 RX ORDER — MAGNESIUM HYDROXIDE 1200 MG/15ML
LIQUID ORAL AS NEEDED
Status: DISCONTINUED | OUTPATIENT
Start: 2018-11-12 | End: 2018-11-12 | Stop reason: HOSPADM

## 2018-11-12 RX ORDER — FENTANYL CITRATE 50 UG/ML
INJECTION, SOLUTION INTRAMUSCULAR; INTRAVENOUS AS NEEDED
Status: DISCONTINUED | OUTPATIENT
Start: 2018-11-12 | End: 2018-11-12 | Stop reason: SURG

## 2018-11-12 RX ORDER — CEFAZOLIN SODIUM 2 G/100ML
2 INJECTION, SOLUTION INTRAVENOUS ONCE
Status: COMPLETED | OUTPATIENT
Start: 2018-11-12 | End: 2018-11-12

## 2018-11-12 RX ORDER — HYDROMORPHONE HYDROCHLORIDE 1 MG/ML
0.5 INJECTION, SOLUTION INTRAMUSCULAR; INTRAVENOUS; SUBCUTANEOUS
Status: DISCONTINUED | OUTPATIENT
Start: 2018-11-12 | End: 2018-11-12 | Stop reason: HOSPADM

## 2018-11-12 RX ORDER — MELOXICAM 15 MG/1
15 TABLET ORAL ONCE
Status: DISCONTINUED | OUTPATIENT
Start: 2018-11-12 | End: 2018-11-12

## 2018-11-12 RX ORDER — EPHEDRINE SULFATE 50 MG/ML
5 INJECTION, SOLUTION INTRAVENOUS ONCE AS NEEDED
Status: DISCONTINUED | OUTPATIENT
Start: 2018-11-12 | End: 2018-11-12 | Stop reason: HOSPADM

## 2018-11-12 RX ORDER — DEXAMETHASONE SODIUM PHOSPHATE 10 MG/ML
INJECTION INTRAMUSCULAR; INTRAVENOUS AS NEEDED
Status: DISCONTINUED | OUTPATIENT
Start: 2018-11-12 | End: 2018-11-12 | Stop reason: SURG

## 2018-11-12 RX ORDER — UREA 10 %
3 LOTION (ML) TOPICAL NIGHTLY PRN
Status: DISCONTINUED | OUTPATIENT
Start: 2018-11-12 | End: 2018-11-13 | Stop reason: HOSPADM

## 2018-11-12 RX ORDER — ASPIRIN 325 MG
325 TABLET, DELAYED RELEASE (ENTERIC COATED) ORAL EVERY 12 HOURS SCHEDULED
Status: DISCONTINUED | OUTPATIENT
Start: 2018-11-13 | End: 2018-11-13 | Stop reason: HOSPADM

## 2018-11-12 RX ORDER — ONDANSETRON 2 MG/ML
4 INJECTION INTRAMUSCULAR; INTRAVENOUS EVERY 6 HOURS PRN
Status: DISCONTINUED | OUTPATIENT
Start: 2018-11-12 | End: 2018-11-13 | Stop reason: HOSPADM

## 2018-11-12 RX ORDER — MIDAZOLAM HYDROCHLORIDE 1 MG/ML
2 INJECTION INTRAMUSCULAR; INTRAVENOUS
Status: DISCONTINUED | OUTPATIENT
Start: 2018-11-12 | End: 2018-11-12 | Stop reason: HOSPADM

## 2018-11-12 RX ORDER — SODIUM CHLORIDE, SODIUM LACTATE, POTASSIUM CHLORIDE, CALCIUM CHLORIDE 600; 310; 30; 20 MG/100ML; MG/100ML; MG/100ML; MG/100ML
9 INJECTION, SOLUTION INTRAVENOUS CONTINUOUS PRN
Status: DISCONTINUED | OUTPATIENT
Start: 2018-11-12 | End: 2018-11-12 | Stop reason: HOSPADM

## 2018-11-12 RX ORDER — HYDROCODONE BITARTRATE AND ACETAMINOPHEN 7.5; 325 MG/1; MG/1
1 TABLET ORAL ONCE AS NEEDED
Status: DISCONTINUED | OUTPATIENT
Start: 2018-11-12 | End: 2018-11-12 | Stop reason: HOSPADM

## 2018-11-12 RX ORDER — TRANEXAMIC ACID 100 MG/ML
INJECTION, SOLUTION INTRAVENOUS AS NEEDED
Status: DISCONTINUED | OUTPATIENT
Start: 2018-11-12 | End: 2018-11-12 | Stop reason: SURG

## 2018-11-12 RX ORDER — PROPOFOL 10 MG/ML
VIAL (ML) INTRAVENOUS AS NEEDED
Status: DISCONTINUED | OUTPATIENT
Start: 2018-11-12 | End: 2018-11-12 | Stop reason: SURG

## 2018-11-12 RX ORDER — SODIUM CHLORIDE 0.9 % (FLUSH) 0.9 %
3 SYRINGE (ML) INJECTION EVERY 12 HOURS SCHEDULED
Status: DISCONTINUED | OUTPATIENT
Start: 2018-11-12 | End: 2018-11-12 | Stop reason: HOSPADM

## 2018-11-12 RX ORDER — CEFAZOLIN SODIUM 2 G/100ML
2 INJECTION, SOLUTION INTRAVENOUS EVERY 8 HOURS
Status: COMPLETED | OUTPATIENT
Start: 2018-11-12 | End: 2018-11-13

## 2018-11-12 RX ORDER — NALOXONE HCL 0.4 MG/ML
0.2 VIAL (ML) INJECTION AS NEEDED
Status: DISCONTINUED | OUTPATIENT
Start: 2018-11-12 | End: 2018-11-12 | Stop reason: HOSPADM

## 2018-11-12 RX ORDER — FAMOTIDINE 10 MG/ML
20 INJECTION, SOLUTION INTRAVENOUS
Status: DISCONTINUED | OUTPATIENT
Start: 2018-11-12 | End: 2018-11-12 | Stop reason: HOSPADM

## 2018-11-12 RX ORDER — BUPIVACAINE HYDROCHLORIDE AND EPINEPHRINE 5; 5 MG/ML; UG/ML
INJECTION, SOLUTION EPIDURAL; INTRACAUDAL; PERINEURAL
Status: COMPLETED | OUTPATIENT
Start: 2018-11-12 | End: 2018-11-12

## 2018-11-12 RX ORDER — PREGABALIN 75 MG/1
150 CAPSULE ORAL ONCE
Status: COMPLETED | OUTPATIENT
Start: 2018-11-12 | End: 2018-11-12

## 2018-11-12 RX ORDER — DIPHENHYDRAMINE HCL 25 MG
25 CAPSULE ORAL EVERY 6 HOURS PRN
Status: DISCONTINUED | OUTPATIENT
Start: 2018-11-12 | End: 2018-11-12 | Stop reason: HOSPADM

## 2018-11-12 RX ORDER — PROMETHAZINE HYDROCHLORIDE 25 MG/1
12.5 TABLET ORAL ONCE AS NEEDED
Status: DISCONTINUED | OUTPATIENT
Start: 2018-11-12 | End: 2018-11-12 | Stop reason: HOSPADM

## 2018-11-12 RX ORDER — BISACODYL 10 MG
10 SUPPOSITORY, RECTAL RECTAL DAILY PRN
Status: DISCONTINUED | OUTPATIENT
Start: 2018-11-12 | End: 2018-11-13 | Stop reason: HOSPADM

## 2018-11-12 RX ORDER — OXYCODONE AND ACETAMINOPHEN 7.5; 325 MG/1; MG/1
1 TABLET ORAL ONCE AS NEEDED
Status: DISCONTINUED | OUTPATIENT
Start: 2018-11-12 | End: 2018-11-12 | Stop reason: HOSPADM

## 2018-11-12 RX ORDER — FLUMAZENIL 0.1 MG/ML
0.2 INJECTION INTRAVENOUS AS NEEDED
Status: DISCONTINUED | OUTPATIENT
Start: 2018-11-12 | End: 2018-11-12 | Stop reason: HOSPADM

## 2018-11-12 RX ORDER — SODIUM CHLORIDE 0.9 % (FLUSH) 0.9 %
3-10 SYRINGE (ML) INJECTION AS NEEDED
Status: DISCONTINUED | OUTPATIENT
Start: 2018-11-12 | End: 2018-11-12 | Stop reason: HOSPADM

## 2018-11-12 RX ORDER — PROMETHAZINE HYDROCHLORIDE 25 MG/ML
12.5 INJECTION, SOLUTION INTRAMUSCULAR; INTRAVENOUS ONCE AS NEEDED
Status: DISCONTINUED | OUTPATIENT
Start: 2018-11-12 | End: 2018-11-12 | Stop reason: HOSPADM

## 2018-11-12 RX ORDER — PROMETHAZINE HYDROCHLORIDE 25 MG/1
25 TABLET ORAL ONCE AS NEEDED
Status: DISCONTINUED | OUTPATIENT
Start: 2018-11-12 | End: 2018-11-12 | Stop reason: HOSPADM

## 2018-11-12 RX ORDER — HYDROCODONE BITARTRATE AND ACETAMINOPHEN 7.5; 325 MG/1; MG/1
1 TABLET ORAL EVERY 4 HOURS PRN
Status: DISCONTINUED | OUTPATIENT
Start: 2018-11-12 | End: 2018-11-13 | Stop reason: HOSPADM

## 2018-11-12 RX ORDER — HYDROCODONE BITARTRATE AND ACETAMINOPHEN 7.5; 325 MG/1; MG/1
2 TABLET ORAL EVERY 4 HOURS PRN
Status: DISCONTINUED | OUTPATIENT
Start: 2018-11-12 | End: 2018-11-13 | Stop reason: HOSPADM

## 2018-11-12 RX ORDER — LABETALOL HYDROCHLORIDE 5 MG/ML
5 INJECTION, SOLUTION INTRAVENOUS
Status: DISCONTINUED | OUTPATIENT
Start: 2018-11-12 | End: 2018-11-12 | Stop reason: HOSPADM

## 2018-11-12 RX ORDER — LETROZOLE 2.5 MG/1
2.5 TABLET, FILM COATED ORAL DAILY
Status: DISCONTINUED | OUTPATIENT
Start: 2018-11-12 | End: 2018-11-13 | Stop reason: HOSPADM

## 2018-11-12 RX ORDER — MIDAZOLAM HYDROCHLORIDE 1 MG/ML
1 INJECTION INTRAMUSCULAR; INTRAVENOUS
Status: DISCONTINUED | OUTPATIENT
Start: 2018-11-12 | End: 2018-11-12 | Stop reason: HOSPADM

## 2018-11-12 RX ORDER — ROCURONIUM BROMIDE 10 MG/ML
INJECTION, SOLUTION INTRAVENOUS AS NEEDED
Status: DISCONTINUED | OUTPATIENT
Start: 2018-11-12 | End: 2018-11-12 | Stop reason: SURG

## 2018-11-12 RX ORDER — FENTANYL CITRATE 50 UG/ML
50 INJECTION, SOLUTION INTRAMUSCULAR; INTRAVENOUS
Status: DISCONTINUED | OUTPATIENT
Start: 2018-11-12 | End: 2018-11-12 | Stop reason: HOSPADM

## 2018-11-12 RX ORDER — ONDANSETRON 4 MG/1
4 TABLET, ORALLY DISINTEGRATING ORAL EVERY 6 HOURS PRN
Status: DISCONTINUED | OUTPATIENT
Start: 2018-11-12 | End: 2018-11-13 | Stop reason: HOSPADM

## 2018-11-12 RX ORDER — PROMETHAZINE HYDROCHLORIDE 25 MG/1
25 SUPPOSITORY RECTAL ONCE AS NEEDED
Status: DISCONTINUED | OUTPATIENT
Start: 2018-11-12 | End: 2018-11-12 | Stop reason: HOSPADM

## 2018-11-12 RX ORDER — ONDANSETRON 4 MG/1
4 TABLET, FILM COATED ORAL EVERY 6 HOURS PRN
Status: DISCONTINUED | OUTPATIENT
Start: 2018-11-12 | End: 2018-11-13 | Stop reason: HOSPADM

## 2018-11-12 RX ORDER — DOCUSATE SODIUM 100 MG/1
100 CAPSULE, LIQUID FILLED ORAL 2 TIMES DAILY
Status: DISCONTINUED | OUTPATIENT
Start: 2018-11-12 | End: 2018-11-13 | Stop reason: HOSPADM

## 2018-11-12 RX ORDER — FLUTICASONE PROPIONATE 50 MCG
2 SPRAY, SUSPENSION (ML) NASAL DAILY
Status: DISCONTINUED | OUTPATIENT
Start: 2018-11-12 | End: 2018-11-13 | Stop reason: HOSPADM

## 2018-11-12 RX ORDER — ACETAMINOPHEN 10 MG/ML
1000 INJECTION, SOLUTION INTRAVENOUS ONCE
Status: COMPLETED | OUTPATIENT
Start: 2018-11-12 | End: 2018-11-12

## 2018-11-12 RX ADMIN — TRANEXAMIC ACID 1000 MG: 100 INJECTION, SOLUTION INTRAVENOUS at 09:54

## 2018-11-12 RX ADMIN — LETROZOLE 2.5 MG: 2.5 TABLET ORAL at 20:32

## 2018-11-12 RX ADMIN — CEFAZOLIN SODIUM 2 G: 2 INJECTION, SOLUTION INTRAVENOUS at 18:04

## 2018-11-12 RX ADMIN — ACETAMINOPHEN 1000 MG: 10 INJECTION, SOLUTION INTRAVENOUS at 09:56

## 2018-11-12 RX ADMIN — DEXAMETHASONE SODIUM PHOSPHATE 10 MG: 10 INJECTION INTRAMUSCULAR; INTRAVENOUS at 10:00

## 2018-11-12 RX ADMIN — FENTANYL CITRATE 50 MCG: 50 INJECTION, SOLUTION INTRAMUSCULAR; INTRAVENOUS at 12:15

## 2018-11-12 RX ADMIN — POLYETHYLENE GLYCOL 3350 17 G: 17 POWDER, FOR SOLUTION ORAL at 20:31

## 2018-11-12 RX ADMIN — VANCOMYCIN HYDROCHLORIDE 1500 MG: 10 INJECTION, POWDER, LYOPHILIZED, FOR SOLUTION INTRAVENOUS at 08:13

## 2018-11-12 RX ADMIN — ONDANSETRON HYDROCHLORIDE 4 MG: 2 SOLUTION INTRAMUSCULAR; INTRAVENOUS at 11:21

## 2018-11-12 RX ADMIN — HYDROCODONE BITARTRATE AND ACETAMINOPHEN 1 TABLET: 7.5; 325 TABLET ORAL at 13:44

## 2018-11-12 RX ADMIN — FENTANYL CITRATE 50 MCG: 50 INJECTION, SOLUTION INTRAMUSCULAR; INTRAVENOUS at 12:00

## 2018-11-12 RX ADMIN — MIDAZOLAM 2 MG: 1 INJECTION INTRAMUSCULAR; INTRAVENOUS at 08:43

## 2018-11-12 RX ADMIN — ROCURONIUM BROMIDE 50 MG: 10 INJECTION INTRAVENOUS at 09:52

## 2018-11-12 RX ADMIN — SODIUM CHLORIDE, POTASSIUM CHLORIDE, SODIUM LACTATE AND CALCIUM CHLORIDE 9 ML/HR: 600; 310; 30; 20 INJECTION, SOLUTION INTRAVENOUS at 08:39

## 2018-11-12 RX ADMIN — CEFAZOLIN SODIUM 2 G: 2 INJECTION, SOLUTION INTRAVENOUS at 09:56

## 2018-11-12 RX ADMIN — PROPOFOL 150 MG: 10 INJECTION, EMULSION INTRAVENOUS at 09:52

## 2018-11-12 RX ADMIN — TRANEXAMIC ACID 1000 MG: 100 INJECTION, SOLUTION INTRAVENOUS at 11:10

## 2018-11-12 RX ADMIN — HYDROMORPHONE HYDROCHLORIDE 0.5 MG: 1 INJECTION, SOLUTION INTRAMUSCULAR; INTRAVENOUS; SUBCUTANEOUS at 12:00

## 2018-11-12 RX ADMIN — FENTANYL CITRATE 50 MCG: 50 INJECTION INTRAMUSCULAR; INTRAVENOUS at 09:52

## 2018-11-12 RX ADMIN — SUGAMMADEX 200 MG: 100 INJECTION, SOLUTION INTRAVENOUS at 11:18

## 2018-11-12 RX ADMIN — HYDROCODONE BITARTRATE AND ACETAMINOPHEN 1 TABLET: 7.5; 325 TABLET ORAL at 18:04

## 2018-11-12 RX ADMIN — MUPIROCIN 10 APPLICATION: 20 OINTMENT TOPICAL at 20:31

## 2018-11-12 RX ADMIN — DOCUSATE SODIUM 100 MG: 100 CAPSULE, LIQUID FILLED ORAL at 20:31

## 2018-11-12 RX ADMIN — HYDROMORPHONE HYDROCHLORIDE 0.5 MG: 1 INJECTION, SOLUTION INTRAMUSCULAR; INTRAVENOUS; SUBCUTANEOUS at 12:35

## 2018-11-12 RX ADMIN — HYDROCODONE BITARTRATE AND ACETAMINOPHEN 2 TABLET: 7.5; 325 TABLET ORAL at 22:29

## 2018-11-12 RX ADMIN — BUPIVACAINE HYDROCHLORIDE AND EPINEPHRINE BITARTRATE 20 ML: 5; .0091 INJECTION, SOLUTION EPIDURAL; INTRACAUDAL; PERINEURAL at 08:46

## 2018-11-12 RX ADMIN — PREGABALIN 150 MG: 75 CAPSULE ORAL at 08:17

## 2018-11-12 RX ADMIN — SODIUM CHLORIDE, POTASSIUM CHLORIDE, SODIUM LACTATE AND CALCIUM CHLORIDE: 600; 310; 30; 20 INJECTION, SOLUTION INTRAVENOUS at 09:56

## 2018-11-12 RX ADMIN — SERTRALINE 50 MG: 50 TABLET, FILM COATED ORAL at 16:39

## 2018-11-12 RX ADMIN — LIDOCAINE HYDROCHLORIDE 60 MG: 20 INJECTION, SOLUTION INFILTRATION; PERINEURAL at 09:52

## 2018-11-12 RX ADMIN — SODIUM CHLORIDE, POTASSIUM CHLORIDE, SODIUM LACTATE AND CALCIUM CHLORIDE: 600; 310; 30; 20 INJECTION, SOLUTION INTRAVENOUS at 09:52

## 2018-11-12 RX ADMIN — METFORMIN HYDROCHLORIDE 1000 MG: 1000 TABLET ORAL at 16:39

## 2018-11-12 RX ADMIN — FENTANYL CITRATE 50 MCG: 50 INJECTION INTRAMUSCULAR; INTRAVENOUS at 10:15

## 2018-11-12 RX ADMIN — FENTANYL CITRATE 50 MCG: 50 INJECTION INTRAMUSCULAR; INTRAVENOUS at 10:05

## 2018-11-12 RX ADMIN — FAMOTIDINE 20 MG: 10 INJECTION, SOLUTION INTRAVENOUS at 08:43

## 2018-11-12 NOTE — PLAN OF CARE
Problem: Patient Care Overview  Goal: Plan of Care Review  Outcome: Ongoing (interventions implemented as appropriate)   11/12/18 1437   Coping/Psychosocial   Plan of Care Reviewed With patient   Plan of Care Review   Progress improving   OTHER   Outcome Summary Postop RTKA. Vitals stable. Pain controlled with oral analgesic. Dressing clean and dry, LASHON dressing intact and functioning. Up to chair. Ambulating with walker. Hx htn, BP WNL continue to monitor. Hx DM, discussed glucose monitoring. Plans to DC home with home health tomorrow.     Goal: Individualization and Mutuality  Outcome: Ongoing (interventions implemented as appropriate)    Goal: Discharge Needs Assessment  Outcome: Ongoing (interventions implemented as appropriate)    Goal: Interprofessional Rounds/Family Conf  Outcome: Ongoing (interventions implemented as appropriate)      Problem: Fall Risk (Adult)  Goal: Identify Related Risk Factors and Signs and Symptoms  Outcome: Outcome(s) achieved Date Met: 11/12/18 11/12/18 1437   Fall Risk (Adult)   Related Risk Factors (Fall Risk) age-related changes;culprit medication(s);gait/mobility problems;environment unfamiliar   Signs and Symptoms (Fall Risk) presence of risk factors     Goal: Absence of Fall  Outcome: Ongoing (interventions implemented as appropriate)   11/12/18 1437   Fall Risk (Adult)   Absence of Fall achieves outcome       Problem: Knee Arthroplasty (Total, Partial) (Adult)  Goal: Signs and Symptoms of Listed Potential Problems Will be Absent, Minimized or Managed (Knee Arthroplasty)  Outcome: Ongoing (interventions implemented as appropriate)   11/12/18 1437   Goal/Outcome Evaluation   Problems Assessed (Knee Arthroplasty) all   Problems Present (Knee Arthroplasty) pain;range of motion decreased     Goal: Anesthesia/Sedation Recovery  Outcome: Ongoing (interventions implemented as appropriate)   11/12/18 1437   Goal/Outcome Evaluation   Anesthesia/Sedation Recovery progressing toward  baseline

## 2018-11-12 NOTE — ANESTHESIA PROCEDURE NOTES
ANESTHESIA INTUBATION  Urgency: elective    Date/Time: 11/12/2018 9:53 AM  Airway not difficult    General Information and Staff    Patient location during procedure: OR  Anesthesiologist: Elijah Toscano MD  CRNA: Kasey Guzman CRNA    Indications and Patient Condition  Indications for airway management: airway protection    Preoxygenated: yes  MILS not maintained throughout  Mask difficulty assessment: 1 - vent by mask    Final Airway Details  Final airway type: endotracheal airway      Successful airway: ETT  Cuffed: yes   Successful intubation technique: direct laryngoscopy  Endotracheal tube insertion site: oral  Blade: Ladarius  Blade size: 3  ETT size (mm): 7.0  Cormack-Lehane Classification: grade I - full view of glottis  Placement verified by: chest auscultation and capnometry   Measured from: lips  ETT to lips (cm): 22  Number of attempts at approach: 1    Additional Comments  PreO2 100%, FEO2 >85, SIVI, easy BMV, sniff position, ETT placed/ confirmed, attraumatic, teeth and lips as preop.

## 2018-11-12 NOTE — ANESTHESIA POSTPROCEDURE EVALUATION
Patient: Komal Sheffield    Procedure Summary     Date:  11/12/18 Room / Location:  [facility] OR [address] [place] [address] MAIN OR    Anesthesia Start:  0940 Anesthesia Stop:  1148    Procedure:  RT TOTAL KNEE ARTHROPLASTY (Right Knee) Diagnosis:       Primary osteoarthritis of right knee      (Primary osteoarthritis of right knee [M17.11])    Surgeon:  Valerio Barakat MD Provider:  Monico Linares MD    Anesthesia Type:  general ASA Status:  3          Anesthesia Type: general  Last vitals  BP   125/75 (11/12/18 1215)   Temp   36.4 °C (97.6 °F) (11/12/18 1142)   Pulse   85 (11/12/18 1215)   Resp   14 (11/12/18 1215)     SpO2   97 % (11/12/18 1215)     Post Anesthesia Care and Evaluation    Patient location during evaluation: PACU  Patient participation: complete - patient participated  Level of consciousness: awake and alert  Pain management: adequate  Airway patency: patent  Anesthetic complications: No anesthetic complications    Cardiovascular status: acceptable  Respiratory status: acceptable  Hydration status: acceptable    Comments: --------------------            11/12/18               1215     --------------------   BP:       125/75     Pulse:      85       Resp:       14       Temp:                SpO2:      97%      --------------------

## 2018-11-12 NOTE — ANESTHESIA PROCEDURE NOTES
Peripheral Block      Start time: 11/12/2018 8:41 AM  Stop time: 11/12/2018 8:46 AM  Reason for block: at surgeon's request and post-op pain management  Performed by  Anesthesiologist: Lamebrto Wang MD  Preanesthetic Checklist  Completed: patient identified, site marked, surgical consent, pre-op evaluation, timeout performed, IV checked, risks and benefits discussed and monitors and equipment checked  Prep:  Pt Position: supine  Sterile barriers:gloves  Prep: ChloraPrep  Patient monitoring: blood pressure monitoring, continuous pulse oximetry and EKG  Procedure  Sedation:yes    Guidance:ultrasound guided  ULTRASOUND INTERPRETATION.  Using ultrasound guidance a 21 G gauge needle was placed in close proximity to the femoral nerve, at which point, under ultrasound guidance anesthetic was injected in the area of the nerve and spread of the anesthesia was seen on ultrasound in close proximity thereto.  There were no abnormalities seen on ultrasound; a digital image was taken; and the patient tolerated the procedure with no complications. Images:still images obtained    Laterality:right  Block Type:adductor canal block    Medications Used: bupivacaine-EPINEPHrine PF (MARCAINE w/EPI) 0.5% -[id number] injection, 20 mL  Post Assessment  Injection Assessment: negative aspiration for heme  Patient Tolerance:comfortable throughout block  Complications:no

## 2018-11-12 NOTE — PLAN OF CARE
Problem: Patient Care Overview  Goal: Plan of Care Review   11/12/18 9115   Coping/Psychosocial   Plan of Care Reviewed With patient;family   OTHER   Outcome Summary Pt. will benefit from skilled inpt. P.T. to address her functional deficits and to assist pt. in regaining her maximum level of independence with functional mobility.

## 2018-11-12 NOTE — THERAPY EVALUATION
"Acute Care - Physical Therapy Initial Evaluation  [facility]     Patient Name: Komal Sheffield  : 1945  MRN: [id number]  Today's Date: 2018   Onset of Illness/Injury or Date of Surgery: 18  Date of Referral to PT: 18  Referring Physician: Valerio Hatch      Admit Date: 2018    Visit Dx:     ICD-10-CM ICD-9-CM   1. Difficulty walking R26.2 719.7   2. Primary osteoarthritis of right knee M17.11 715.16     Patient Active Problem List   Diagnosis   • Primary osteoarthritis of right knee   • Chronic pain of both knees     Past Medical History:   Diagnosis Date   • Arthritis    • Cancer (CMS/HCC)     Breast CA 2016 with a mastectomy    • Depression    • Diabetes mellitus (CMS/HCC)    • Hyperlipidemia    • Hypertension    • Knee pain, bilateral    • Osteopenia      Past Surgical History:   Procedure Laterality Date   • CHOLECYSTECTOMY     • HYSTERECTOMY     • KIDNEY STONE SURGERY     • LYMPH NODE DISSECTION Right    • MASTECTOMY Right    • TONSILLECTOMY          PT ASSESSMENT (last 12 hours)      Physical Therapy Evaluation     Row Name 18 1444          PT Evaluation Time/Intention    Subjective Information  complains of;fatigue;pain;dizziness  -MS     Document Type  evaluation  -MS     Mode of Treatment  physical therapy;individual therapy  -MS     Patient Effort  good  -MS     Comment  Pt. reports pain/soreness in her Right knee as well as \"dizziness\" with upright mobility.  -MS     Row Name 18 1444          General Information    Onset of Illness/Injury or Date of Surgery  18  -MS     Referring Physician  Valerio Hatch  -MS     Patient Observations  agree to therapy;cooperative  -MS     Prior Level of Function  independent:  -MS     Equipment Currently Used at Home  none  -MS     Pertinent History of Current Functional Problem  Pt. is s/p Right TKR  -MS     Existing Precautions/Restrictions  fall  (Significant)   -MS     Equipment Ordered for Patient  walker, front " "wheeled  -MS     Risks Reviewed  patient:  -MS     Benefits Reviewed  patient:  -MS     Barriers to Rehab  none identified  -MS     Row Name 11/12/18 1444          Cognitive Assessment/Intervention- PT/OT    Orientation Status (Cognition)  oriented x 3  -MS     Follows Commands (Cognition)  WNL  -MS     Personal Safety Interventions  gait belt;fall prevention program maintained;nonskid shoes/slippers when out of bed;supervised activity  -MS     Row Name 11/12/18 1444          Mobility Assessment/Treatment    Extremity Weight-bearing Status  right lower extremity  -MS     Right Lower Extremity (Weight-bearing Status)  weight-bearing as tolerated (WBAT)  -MS     Row Name 11/12/18 1444          Bed Mobility Assessment/Treatment    Bed Mobility Assessment/Treatment  supine-sit;sit-supine  -MS     Supine-Sit [place] (Bed Mobility)  independent  -MS     Row Name 11/12/18 1444          Transfer Assessment/Treatment    Transfer Assessment/Treatment  sit-stand transfer;stand-sit transfer  -MS     Sit-Stand [place] (Transfers)  minimum assist (75% patient effort)  -MS     Stand-Sit [place] (Transfers)  minimum assist (75% patient effort)  -MS     Row Name 11/12/18 1444          Sit-Stand Transfer    Assistive Device (Sit-Stand Transfers)  walker, front-wheeled  -MS     Row Name 11/12/18 1444          Stand-Sit Transfer    Assistive Device (Stand-Sit Transfers)  walker, front-wheeled  -MS     Row Name 11/12/18 1443          Gait/Stairs Assessment/Training    [place] Level (Gait)  contact guard  -MS     Assistive Device (Gait)  walker, front-wheeled  -MS     Distance in Feet (Gait)  20 feet  -MS     Pattern (Gait)  step-to  -MS     Deviations/Abnormal Patterns (Gait)  antalgic;chelle decreased  -MS     Comment (Gait/Stairs)  x 1 seated rest break due to increased \"dizziness\".  Had to bring chair up to pt. for safety.  Verbal/tactile cues given for posture correction and for Rwx guidance.  -MS     Row Name " 11/12/18 1444          General ROM    GENERAL ROM COMMENTS  BUE/LLE (WFL's)  -MS     Row Name 11/12/18 1444          MMT (Manual Muscle Testing)    General MMT Comments  BUE/LLE (>/=3/5)  -MS     Row Name 11/12/18 1444          Therapeutic Exercise    Comment (Therapeutic Exercise)  Right TKR protocol x 10 reps completed  -MS     Row Name 11/12/18 1444          Pain Assessment    Additional Documentation  Pain Scale: Numbers Pre/Post-Treatment (Group)  -MS     Row Name 11/12/18 1444          Pain Scale: Numbers Pre/Post-Treatment    Pain Scale: Numbers, Pretreatment  4/10  -MS     Pain Scale: Numbers, Post-Treatment  4/10  -MS     Pain Location - Side  Right  -MS     Pain Location  knee  -MS     Pain Intervention(s)  Medication (See MAR);Cold applied;Repositioned;Elevated;Rest  -MS     Row Name             Wound 11/12/18 1024 Right knee incision    Wound - Properties Group Date first assessed: 11/12/18  -RG Time first assessed: 1024  -RG Side: Right  -RG Location: knee  -RG Type: incision  -RG    Row Name 11/12/18 1444          Physical Therapy Clinical Impression    Date of Referral to PT  11/12/18  -MS     Criteria for Skilled Interventions Met (PT Clinical Impression)  treatment indicated  -MS     Rehab Potential (PT Clinical Summary)  good, to achieve stated therapy goals  -MS     Row Name 11/12/18 1448          Physical Therapy Goals    Transfer Goal Selection (PT)  transfer, PT goal 1  -MS     Gait Training Goal Selection (PT)  gait training, PT goal 1  -MS     ROM Goal Selection (PT)  ROM, PT goal 1  -MS     Stairs Goal Selection (PT)  stairs, PT goal 1  -MS     Additional Documentation  ROM Goal Selection (PT) (Row);Stairs Goal Selection (PT) (Row)  -MS     Row Name 11/12/18 1449          Transfer Goal 1 (PT)    Activity/Assistive Device (Transfer Goal 1, PT)  transfers, all;walker, rolling  -MS     [place] Level/Cues Needed (Transfer Goal 1, PT)  independent  -MS     Time Frame (Transfer Goal 1, PT)   long term goal (LTG);3 days  -MS     Row Name 11/12/18 1444          Gait Training Goal 1 (PT)    Activity/Assistive Device (Gait Training Goal 1, PT)  gait (walking locomotion);walker, rolling  -MS     [place] Level (Gait Training Goal 1, PT)  independent  -MS     Distance (Gait Goal 1, PT)  100 feet  -MS     Time Frame (Gait Training Goal 1, PT)  long term goal (LTG);2 - 3 days  -MS     Row Name 11/12/18 1444          ROM Goal 1 (PT)    ROM Goal 1 (PT)  Right knee AROM (5, 85)  -MS     Time Frame (ROM Goal 1, PT)  long term goal (LTG);2 - 3 days  -MS     Row Name 11/12/18 1444          Stairs Goal 1 (PT)    Activity/Assistive Device (Stairs Goal 1, PT)  stairs, all skills  -MS     [place] Level/Cues Needed (Stairs Goal 1, PT)  contact guard assist  -MS     Number of Stairs (Stairs Goal 1, PT)  1  -MS     Time Frame (Stairs Goal 1, PT)  long term goal (LTG);3 days  -MS     Row Name 11/12/18 1444          Positioning and Restraints    Pre-Treatment Position  in bed  -MS     Post Treatment Position  chair  -MS     In Chair  notified nsg;reclined;sitting;call light within reach;encouraged to call for assist;exit alarm on;with family/caregiver All lines intact. Ice pack to Right knee.  -MS       User Key  (r) = Recorded By, (t) = Taken By, (c) = Cosigned By    Initials Name Provider Type    Roshni Marie, RN Registered Nurse    Fausto Montes, PT Physical Therapist        Physical Therapy Education     Title: PT OT SLP Therapies (Done)     Topic: Physical Therapy (Done)     Point: Mobility training (Done)     Learning Progress Summary           Patient Acceptance, E,D, VU,NR by MS at 11/12/2018  2:49 PM                   Point: Home exercise program (Done)     Learning Progress Summary           Patient Acceptance, E,D, VU,NR by MS at 11/12/2018  2:49 PM                   Point: Body mechanics (Done)     Learning Progress Summary           Patient Acceptance, E,D, VU,NR by MS at 11/12/2018  2:49  PM                   Point: Precautions (Done)     Learning Progress Summary           Patient Acceptance, E,D, VU,NR by MS at 11/12/2018  2:49 PM                               User Key     Initials Effective Dates Name Provider Type Discipline    MS 04/03/18 -  Fausto Mcleod PT Physical Therapist PT              PT Recommendation and Plan  Anticipated Discharge Disposition (PT): home with assist, home with home health  Planned Therapy Interventions (PT Eval): balance training, bed mobility training, gait training, home exercise program, patient/family education, postural re-education, ROM (range of motion), stair training, strengthening, transfer training  Therapy Frequency (PT Clinical Impression): 2 times/day  Outcome Summary/Treatment Plan (PT)  Anticipated Discharge Disposition (PT): home with assist, home with home health  Plan of Care Reviewed With: patient, family  Outcome Summary: Pt. will benefit from skilled inpt. P.T. to address her functional deficits and to assist pt. in regaining her maximum level of independence with functional mobility.  Outcome Measures     Row Name 11/12/18 1400             How much help from another person do you currently need...    Turning from your back to your side while in flat bed without using bedrails?  4  -MS      Moving from lying on back to sitting on the side of a flat bed without bedrails?  4  -MS      Moving to and from a bed to a chair (including a wheelchair)?  3  -MS      Standing up from a chair using your arms (e.g., wheelchair, bedside chair)?  3  -MS      Climbing 3-5 steps with a railing?  3  -MS      To walk in hospital room?  3  -MS      AM-PAC 6 Clicks Score  20  -MS         Functional Assessment    Outcome Measure Options  AM-PAC 6 Clicks Basic Mobility (PT)  -MS        User Key  (r) = Recorded By, (t) = Taken By, (c) = Cosigned By    Initials Name Provider Type    MS Fausto Mcleod, PT Physical Therapist         Time Calculation:   PT Charges      Row Name 11/12/18 1450             Time Calculation    Start Time  1408  -MS      Stop Time  1430  -MS      Time Calculation (min)  22 min  -MS      PT Received On  11/12/18  -MS      PT - Next Appointment  11/13/18  -MS      PT Goal Re-Cert Due Date  11/14/18  -MS         Time Calculation- PT    Total Timed Code Minutes- PT  18 minute(s)  -MS        User Key  (r) = Recorded By, (t) = Taken By, (c) = Cosigned By    Initials Name Provider Type    Fausto Montes, PT Physical Therapist        Therapy Suggested Charges     Code   Minutes Charges    None           Therapy Charges for Today     Code Description Service Date Service Provider Modifiers Qty    90299210240 HC PT MOBILITY CURRENT 11/12/2018 Fausto Mcleod, PT GP,  1    41799888838 HC PT MOBILITY PROJECTED 11/12/2018 Fausto Mcleod, PT GP,  1    07060873294 HC PT EVAL LOW COMPLEXITY 1 11/12/2018 Fausto Mcleod, PT GP 1    63178659488 HC PT THER PROC EA 15 MIN 11/12/2018 Fausto Mcleod, PT GP 1          PT G-Codes  PT Professional Judgement Used?: Yes  Outcome Measure Options: AM-PAC 6 Clicks Basic Mobility (PT)  AM-PAC 6 Clicks Score: 20  Functional Limitation: Mobility: Walking and moving around  Mobility: Walking and Moving Around Current Status (): At least 20 percent but less than 40 percent impaired, limited or restricted  Mobility: Walking and Moving Around Goal Status (): 0 percent impaired, limited or restricted      Fausto Mcleod, PT  11/12/2018

## 2018-11-12 NOTE — ANESTHESIA PREPROCEDURE EVALUATION
Anesthesia Evaluation     Patient summary reviewed   NPO Solid Status: > 8 hours             Airway   Mallampati: II  No difficulty expected  Dental      Pulmonary    Cardiovascular     ECG reviewed  Rhythm: regular    (+) hypertension,       Neuro/Psych  GI/Hepatic/Renal/Endo    (+)   diabetes mellitus,     Musculoskeletal     Abdominal    Substance History      OB/GYN          Other                        Anesthesia Plan    ASA 3     general     Anesthetic plan, all risks, benefits, and alternatives have been provided, discussed and informed consent has been obtained with: patient.  Use of blood products discussed with patient .

## 2018-11-12 NOTE — OP NOTE
Name: Komal Sheffield  YOB: 1945    DATE OF SURGERY: 11/12/2018    PREOPERATIVE DIAGNOSIS: Right knee end-stage osteoarthritis    POSTOPERATIVE DIAGNOSIS: Right knee end-stage osteoarthritis    PROCEDURE PERFORMED: Right total knee replacement    SURGEON: Valerio Barakat M.D.    ASSISTANT: KERON WRIGHT    IMPLANTS: [employer]:     Implant Name Type Inv. Item Serial No.  Lot No. LRB No. Used   CMT BONE PALACOS R HI/VISC 1X40 - [id number] Implant CMT BONE PALACOS R HI/VISC 1X40  [facility] [id number] Right 1   PAT GEN2 BICONVEX [id number] - [id number] Implant PAT GEN2 BICONVEX [id number]  [employer] [id number] Right 1   BASE TIB/KN GEN2 NONPOR TI SZ4 RT - [id number] Implant BASE TIB/KN GEN2 NONPOR TI SZ4 RT  [employer] [id number] Right 1   COMP FEM LEGION OXINIUM PS SZ5 RT - [id number] Implant COMP FEM LEGION OXINIUM PS SZ5 RT  [employer] [id number] Right 1   INSRT TIB FLX HI GEN2 PS SZ3TO4 9MM - [id number] Implant INSRT TIB FLX HI GEN2 PS SZ3TO4 9MM  [employer] [id number] Right 1       Estimated Blood Loss: 200cc  Specimens : none  Complications: none    DESCRIPTION OF PROCEDURE: The patient was taken to the operating room and placed in the supine position. A sequential compression device was carefully placed on the non-operative leg. Preoperative antibiotics were administered. Surgical time out was performed. After adequate induction of anesthesia, the leg was prepped and draped in the usual sterile fashion, exsanguinated with an Esmarch bandage and the tourniquet inflated to 250 mmHg. A midline incision was performed followed by a medial parapatellar arthrotomy. The patella was subluxed laterally.  A portion of the fat pad, ACL, and anterior horns of the meniscus were excised. The drill hole was placed in the distal femur and the canal was the irrigated and suctioned. The IM tara was placed and a 5 degree distal valgus cut was performed on the femur. The femur  was then sized with a sizing guide. The femoral cutting block was placed and all femoral cuts were performed. The proximal tibia was exposed. We used the extramedullary tibial cutting guide set for removal of 9mm of bone off the high side. The tibial cut was performed. The posterior horns of the menisci were excised. The posterior osteophytes were removed. Flexion extension blocks were then used to balance the knee. The tibial cut surface was then sized with the sizing templates and the tibial and femoral trial were then placed. The knee was placed in full extension and then the tibial tray rotation was then matched to the femoral rotation and marked.    Attention was then placed to the patella. The patella was noted to track centrally through range of motion. The patella was then sized with the trials. The thickness of the patella was then measured. The patella was resurfaced and the surrounding osteophytes were removed. The preoperative thickness was reproduced. The patella tracked centrally through range of motion.   At this point all trial components were removed, the knee was copiously irrigated with pulsed lavage, and the knee was injected with anesthetic cocktail solution. The cut surfaces were then dried with clean lap sponges, and the components were cemented tibia, followed by femur, then patella. The knee was held in full extension and all excess cement was removed. The knee was held still until the cement had completely hardened. We then placed the trial polyethylene spacer which resulted in full extension and excellent flexion-extension balance. We placed the final polyethylene spacer.   The knee was then copiously irrigated. The tourniquet was then released. There was excellent hemostasis. We placed a one-eighth inch Hemovac drain. We closed the knee in multiple layers in standard fashion. Sterile dressing were applied. At the end of the case, the sponge and needle counts were reported as being  correct. There were no known complications. The patient was then transported to the recovery room.      Valerio Barakat M.D.

## 2018-11-13 VITALS
HEART RATE: 84 BPM | SYSTOLIC BLOOD PRESSURE: 110 MMHG | TEMPERATURE: 99.1 F | WEIGHT: 204 LBS | OXYGEN SATURATION: 96 % | RESPIRATION RATE: 16 BRPM | HEIGHT: 63 IN | BODY MASS INDEX: 36.14 KG/M2 | DIASTOLIC BLOOD PRESSURE: 71 MMHG

## 2018-11-13 LAB
GLUCOSE BLDC GLUCOMTR-MCNC: 124 MG/DL (ref 70–130)
HCT VFR BLD AUTO: 32.7 % (ref 35.6–45.5)
HGB BLD-MCNC: 10.4 G/DL (ref 11.9–15.5)

## 2018-11-13 PROCEDURE — G0378 HOSPITAL OBSERVATION PER HR: HCPCS

## 2018-11-13 PROCEDURE — 63710000001 METFORMIN 1000 MG TABLET: Performed by: NURSE PRACTITIONER

## 2018-11-13 PROCEDURE — 82962 GLUCOSE BLOOD TEST: CPT

## 2018-11-13 PROCEDURE — 25010000003 CEFAZOLIN IN DEXTROSE 2-4 GM/100ML-% SOLUTION: Performed by: NURSE PRACTITIONER

## 2018-11-13 PROCEDURE — A9270 NON-COVERED ITEM OR SERVICE: HCPCS | Performed by: NURSE PRACTITIONER

## 2018-11-13 PROCEDURE — 63710000001 DOCUSATE SODIUM 100 MG CAPSULE: Performed by: NURSE PRACTITIONER

## 2018-11-13 PROCEDURE — 85018 HEMOGLOBIN: CPT | Performed by: NURSE PRACTITIONER

## 2018-11-13 PROCEDURE — 63710000001 HYDROCHLOROTHIAZIDE 25 MG TABLET 1,000 EACH BOTTLE: Performed by: NURSE PRACTITIONER

## 2018-11-13 PROCEDURE — 63710000001 FLUTICASONE 50 MCG/ACT SUSPENSION 16 G BOTTLE: Performed by: NURSE PRACTITIONER

## 2018-11-13 PROCEDURE — 85014 HEMATOCRIT: CPT | Performed by: NURSE PRACTITIONER

## 2018-11-13 PROCEDURE — 97150 GROUP THERAPEUTIC PROCEDURES: CPT

## 2018-11-13 PROCEDURE — 63710000001 MUPIROCIN 2 % OINTMENT: Performed by: NURSE PRACTITIONER

## 2018-11-13 PROCEDURE — 63710000001 SERTRALINE 50 MG TABLET: Performed by: NURSE PRACTITIONER

## 2018-11-13 PROCEDURE — 63710000001 POLYETHYLENE GLYCOL PACK: Performed by: NURSE PRACTITIONER

## 2018-11-13 PROCEDURE — 63710000001 VALSARTAN 80 MG TABLET 90 EACH BOTTLE: Performed by: NURSE PRACTITIONER

## 2018-11-13 PROCEDURE — 97110 THERAPEUTIC EXERCISES: CPT

## 2018-11-13 PROCEDURE — 63710000001 HYDROCODONE-ACETAMINOPHEN 7.5-325 MG TABLET: Performed by: NURSE PRACTITIONER

## 2018-11-13 PROCEDURE — 63710000001 ASPIRIN EC 325 MG TABLET DELAYED-RELEASE: Performed by: NURSE PRACTITIONER

## 2018-11-13 RX ORDER — ONDANSETRON 4 MG/1
4 TABLET, FILM COATED ORAL EVERY 6 HOURS PRN
Qty: 10 TABLET | Refills: 0 | Status: SHIPPED | OUTPATIENT
Start: 2018-11-13 | End: 2018-12-13 | Stop reason: SDUPTHER

## 2018-11-13 RX ORDER — POLYETHYLENE GLYCOL 3350 17 G/17G
17 POWDER, FOR SOLUTION ORAL 2 TIMES DAILY
Qty: 238 G | Refills: 0 | Status: SHIPPED | OUTPATIENT
Start: 2018-11-13 | End: 2018-11-20

## 2018-11-13 RX ORDER — PSEUDOEPHEDRINE HCL 30 MG
100 TABLET ORAL 2 TIMES DAILY
Qty: 27 EACH | Refills: 0 | Status: SHIPPED | OUTPATIENT
Start: 2018-11-13 | End: 2018-11-27

## 2018-11-13 RX ORDER — HYDROCODONE BITARTRATE AND ACETAMINOPHEN 7.5; 325 MG/1; MG/1
TABLET ORAL
Qty: 84 TABLET | Refills: 0 | Status: SHIPPED | OUTPATIENT
Start: 2018-11-13 | End: 2018-11-27 | Stop reason: SDUPTHER

## 2018-11-13 RX ADMIN — DOCUSATE SODIUM 100 MG: 100 CAPSULE, LIQUID FILLED ORAL at 08:00

## 2018-11-13 RX ADMIN — HYDROCHLOROTHIAZIDE: 25 TABLET ORAL at 08:00

## 2018-11-13 RX ADMIN — HYDROCODONE BITARTRATE AND ACETAMINOPHEN 2 TABLET: 7.5; 325 TABLET ORAL at 03:11

## 2018-11-13 RX ADMIN — CEFAZOLIN SODIUM 2 G: 2 INJECTION, SOLUTION INTRAVENOUS at 02:05

## 2018-11-13 RX ADMIN — ASPIRIN 325 MG: 325 TABLET, DELAYED RELEASE ORAL at 08:00

## 2018-11-13 RX ADMIN — POLYETHYLENE GLYCOL 3350 17 G: 17 POWDER, FOR SOLUTION ORAL at 08:00

## 2018-11-13 RX ADMIN — HYDROCODONE BITARTRATE AND ACETAMINOPHEN 2 TABLET: 7.5; 325 TABLET ORAL at 07:29

## 2018-11-13 RX ADMIN — HYDROCODONE BITARTRATE AND ACETAMINOPHEN 2 TABLET: 7.5; 325 TABLET ORAL at 11:33

## 2018-11-13 RX ADMIN — FLUTICASONE PROPIONATE 2 SPRAY: 50 SPRAY, METERED NASAL at 07:59

## 2018-11-13 RX ADMIN — SERTRALINE 50 MG: 50 TABLET, FILM COATED ORAL at 08:00

## 2018-11-13 RX ADMIN — MUPIROCIN 10 APPLICATION: 20 OINTMENT TOPICAL at 07:59

## 2018-11-13 RX ADMIN — METFORMIN HYDROCHLORIDE 1000 MG: 1000 TABLET ORAL at 08:00

## 2018-11-13 NOTE — PLAN OF CARE
Problem: Patient Care Overview  Goal: Plan of Care Review  Outcome: Ongoing (interventions implemented as appropriate)   11/13/18 0035   Coping/Psychosocial   Plan of Care Reviewed With patient   Plan of Care Review   Progress improving   OTHER   Outcome Summary patient ambulating with assistance to bathroom, pain controlled at this time, educated on glucose and b/p monitoring     Goal: Individualization and Mutuality  Outcome: Ongoing (interventions implemented as appropriate)    Goal: Discharge Needs Assessment  Outcome: Ongoing (interventions implemented as appropriate)    Goal: Interprofessional Rounds/Family Conf  Outcome: Ongoing (interventions implemented as appropriate)      Problem: Fall Risk (Adult)  Goal: Absence of Fall  Outcome: Ongoing (interventions implemented as appropriate)      Problem: Knee Arthroplasty (Total, Partial) (Adult)  Goal: Signs and Symptoms of Listed Potential Problems Will be Absent, Minimized or Managed (Knee Arthroplasty)  Outcome: Ongoing (interventions implemented as appropriate)    Goal: Anesthesia/Sedation Recovery  Outcome: Outcome(s) achieved Date Met: 11/13/18

## 2018-11-13 NOTE — PLAN OF CARE
Problem: Patient Care Overview  Goal: Plan of Care Review  Outcome: Outcome(s) achieved Date Met: 11/13/18 11/13/18 6899   Coping/Psychosocial   Plan of Care Reviewed With patient   Plan of Care Review   Progress improving

## 2018-11-13 NOTE — DISCHARGE SUMMARY
Patient Name: Komal Sheffield  Patient YOB: 1945    Date of Admission:  11/12/2018  Date of Discharge:  11/13/2018  Discharge Diagnosis: RT TOTAL KNEE ARTHROPLASTY  Presenting Problem/History of Present Illness: Primary osteoarthritis of right knee [M17.11]  Chronic pain of both knees [M25.561, M25.562, G89.29]  Admitting Physician: Dr Vlaerio Barakat  Consults:   Consults     No orders found for last 30 day(s).          DETAILS OF HOSPITAL STAY:  Patient is a 73 y.o. female was admitted to the floor following the above procedure and underwent an uncomplicated hospital stay.  Patient did well with physical therapy and was ambulating well without problems.  On the day of discharge the wound was clean, dry and intact and calf was soft and non tender and Homans sign was negative.  Patient was tolerating  without problems.  Patient will be discharged home.    Condition on Discharge:  Stable    Vital Signs  Temp:  [97.5 °F (36.4 °C)-99.1 °F (37.3 °C)] 99.1 °F (37.3 °C)  Heart Rate:  [] 84  Resp:  [12-16] 16  BP: ()/() 110/71    LABS:      Admission on 11/12/2018   Component Date Value Ref Range Status   • Glucose 11/12/2018 107  70 - 130 mg/dL Final   • Glucose 11/12/2018 190* 70 - 130 mg/dL Final   • Glucose 11/12/2018 166* 70 - 130 mg/dL Final   • Hemoglobin 11/13/2018 10.4* 11.9 - 15.5 g/dL Final   • Hematocrit 11/13/2018 32.7* 35.6 - 45.5 % Final   • Glucose 11/13/2018 124  70 - 130 mg/dL Final       No results found.    Discharge Medications     Discharge Medications      New Medications      Instructions Start Date   docusate sodium 100 MG capsule   100 mg, Oral, 2 Times Daily      HYDROcodone-acetaminophen 7.5-325 MG per tablet  Commonly known as:  NORCO   1-2 po  q4-6 hr prn pain      ondansetron 4 MG tablet  Commonly known as:  ZOFRAN   4 mg, Oral, Every 6 Hours PRN      polyethylene glycol pack packet  Commonly known as:  MIRALAX   17 g, Oral, 2 Times Daily         Changes to Medications       Instructions Start Date   aspirin 325 MG EC tablet  What changed:    · medication strength  · how much to take  · when to take this  · additional instructions   325 mg, Oral, Every 12 Hours Scheduled         Continue These Medications      Instructions Start Date   acetaminophen 500 MG tablet  Commonly known as:  TYLENOL   500 mg, Oral, Every 6 Hours PRN      cetirizine 10 MG tablet  Commonly known as:  zyrTEC   10 mg, Oral, Daily      fluticasone 50 MCG/ACT nasal spray  Commonly known as:  FLONASE   2 sprays, Nasal, Daily      letrozole 2.5 MG tablet  Commonly known as:  FEMARA   2.5 mg, Oral, Daily      metFORMIN 1000 MG tablet  Commonly known as:  GLUCOPHAGE   1,000 mg, Oral, 2 Times Daily With Meals      rosuvastatin 10 MG tablet  Commonly known as:  CRESTOR   10 mg, Oral, Daily      sertraline 50 MG tablet  Commonly known as:  ZOLOFT   50 mg, Oral, Daily      valsartan-hydrochlorothiazide 80-12.5 MG per tablet  Commonly known as:  DIOVAN-HCT   1 tablet, Oral, Daily         Stop These Medications    Chlorhexidine Gluconate 2 % pads     cholecalciferol 1000 units tablet  Commonly known as:  VITAMIN D3     mupirocin 2 % nasal ointment  Commonly known as:  BACTROBAN            Activity at Discharge:     Discharge Instructions: Patient is to continue with physical therapy exercises daily and continue working with the physical therapist as ordered. Patient may weight bear as tolerated. Apply ice regularly. Patient may ice for long periods of time as long as ice is not directly on the skin. Patient instructed on frequent calf pumping exercises.  Patient also instructed on incentive spirometer during hospitalization and encouraged to continue to use at home regularly.    *The dressing should be left in place until the suction unit stops functioning (typically 7 days.  If waterproof dressing is intact the patient may shower immediately following discharge. If the dressing becomes disloged or saturated it should be  changed. Please refer to the LASHON information sheet if you have any questions about the dressing.  If you have a home health nurse or therapist they can be contacted to assist with dressing change or repair. You may also call the LASHON dressing hotline for questions related to the dressing ([phone]). If there still other problems or questions related to the dressing despite these measures then you can contact oYdit at our office [phone].  After 1 week when the LASHON dressing is removed, the wound should be gently cleaned with antibacterial soap then allowed to dry and covered with a dry sterile dressing. The wound should be covered at all times except while showering.  Patient may change dressings daily and prn using sterile 4x4 and paper tape, and should call if any unusual drainage, redness or swelling.*  Follow up appointment in 2 weeks - patient to call the office at [phone] to schedule.  Patient will be discharged on aspirin 325mg BID x 2weeks, then daily x 4weeks    Discharge Diagnosis:    Patient Active Problem List   Diagnosis   • Primary osteoarthritis of right knee   • Chronic pain of both knees       Follow-up Appointments  Future Appointments   Date Time Provider Department Center   11/27/2018  2:00 PM Valerio Barakat MD [facility] None     Additional Instructions for the Follow-ups that You Need to Schedule     Referral to home health   As directed      PT to see for Home PT protocol. Daily for first week then 3x/ wk for second week. Staples to be removed at f/u appointment in 2 weeks.    Order Comments:  PT to see for Home PT protocol. Daily for first week then 3x/ wk for second week. Staples to be removed at f/u appointment in 2 weeks.     Face to Face Visit Date:  11/13/2018    Follow-up Provider for Plan of Care?:  I will be treating the patient on an ongoing basis.  Please send me the Plan of Care for signature.    Follow-up Provider:  VALERIO BARAKAT [[id number]]    Reason/Clinical Findings:   post op knee replacement    Describe mobility limitations that make leaving home difficult:  pain, swelling, weakness, limited mobility, difficulty ambulating without assistive device    Nursing/Therapeutic Services Requested:  Physicial Therapy                  Valerio Barakat MD  11/13/18  7:53 AM

## 2018-11-13 NOTE — PLAN OF CARE
Problem: Patient Care Overview  Goal: Plan of Care Review  Outcome: Ongoing (interventions implemented as appropriate)   11/13/18 0956   Coping/Psychosocial   Plan of Care Reviewed With patient   Plan of Care Review   Progress improving   OTHER   Outcome Summary PO day 1 RTKA. Vitals stable. Increased pain today, tolerable with PO analgesic. Ambulating with walker. HX htn, BP WNL. DC home with home health.     Goal: Individualization and Mutuality  Outcome: Ongoing (interventions implemented as appropriate)    Goal: Discharge Needs Assessment  Outcome: Ongoing (interventions implemented as appropriate)    Goal: Interprofessional Rounds/Family Conf  Outcome: Ongoing (interventions implemented as appropriate)      Problem: Fall Risk (Adult)  Goal: Absence of Fall  Outcome: Ongoing (interventions implemented as appropriate)   11/13/18 0956   Fall Risk (Adult)   Absence of Fall achieves outcome       Problem: Knee Arthroplasty (Total, Partial) (Adult)  Goal: Signs and Symptoms of Listed Potential Problems Will be Absent, Minimized or Managed (Knee Arthroplasty)  Outcome: Ongoing (interventions implemented as appropriate)   11/13/18 0956   Goal/Outcome Evaluation   Problems Assessed (Knee Arthroplasty) all   Problems Present (Knee Arthroplasty) pain;range of motion decreased

## 2018-11-13 NOTE — THERAPY TREATMENT NOTE
Acute Care - Physical Therapy Treatment Note  [facility]     Patient Name: Komal Sheffield  : 1945  MRN: [id number]  Today's Date: 2018  Onset of Illness/Injury or Date of Surgery: 18  Date of Referral to PT: 18  Referring Physician: Valerio Hatch    Admit Date: 2018    Visit Dx:    ICD-10-CM ICD-9-CM   1. Difficulty walking R26.2 719.7   2. Primary osteoarthritis of right knee M17.11 715.16     Patient Active Problem List   Diagnosis   • Primary osteoarthritis of right knee   • Chronic pain of both knees       Therapy Treatment    Rehabilitation Treatment Summary     Row Name 18             Treatment Time/Intention    Discipline  physical therapy assistant  -      Document Type  therapy note (daily note)  -      Subjective Information  complains of;pain  -      Mode of Treatment  physical therapy  -      Patient Effort  good  -SM      Existing Precautions/Restrictions  fall  -      Recorded by [] Bonny Arriaga, [facility] 18 1314      Row Name 18             Cognitive Assessment/Intervention- PT/OT    Orientation Status (Cognition)  oriented x 4  -      Follows Commands (Cognition)  WNL  -      Personal Safety Interventions  fall prevention program maintained;gait belt;nonskid shoes/slippers when out of bed  -      Recorded by [] Bonny Arriaga, [facility] 18 1314      Row Name 18 0930             Bed Mobility Assessment/Treatment    Comment (Bed Mobility)  up in chair  -      Recorded by [] Bonny Arriaga PTA 18 1401      Row Name 1830             Transfer Assessment/Treatment    Transfer Assessment/Treatment  sit-stand transfer;stand-sit transfer  -      Recorded by [] Bonny Arriaga, HOLLI 18 1401      Row Name 1830             Sit-Stand Transfer    Sit-Stand [place] (Transfers)  stand by assist  -      Assistive Device (Sit-Stand Transfers)  walker, front-wheeled  -       Recorded by [SM] Bonny Arriaga, [facility] 11/13/18 1401      Row Name 11/13/18 0930             Stand-Sit Transfer    Stand-Sit [place] (Transfers)  stand by assist  -      Assistive Device (Stand-Sit Transfers)  walker, front-wheeled  -SM      Recorded by [SM] Bonny Arriaga, [facility] 11/13/18 1401      Row Name 11/13/18 0930             Gait/Stairs Assessment/Training    [place] Level (Gait)  stand by assist  -      Assistive Device (Gait)  walker, front-wheeled  -      Distance in Feet (Gait)  100  -SM      Pattern (Gait)  step-to;step-through  -SM      Deviations/Abnormal Patterns (Gait)  antalgic;chelle decreased;stride length decreased  -SM      Right Sided Gait Deviations  weight shift ability decreased  -      [place] Level (Stairs)  contact guard  -      Handrail Location (Stairs)  both sides  -SM      Number of Steps (Stairs)  1  -SM      Ascending Technique (Stairs)  step-to-step  -SM      Descending Technique (Stairs)  step-to-step  -SM      Recorded by [SM] Bonny Arriaga, [facility] 11/13/18 1401      Row Name 11/13/18 0930             General ROM    GENERAL ROM COMMENTS  R knee 3-105  -      Recorded by [] Bonny Arriaga, [facility] 11/13/18 1401      Row Name 11/13/18 0930             Therapeutic Exercise    Comment (Therapeutic Exercise)  R TKR protocol x 15 reps  -SM      Recorded by [SM] Bonny Arriaga, [facility] 11/13/18 1401      Row Name 11/13/18 0930             Positioning and Restraints    Pre-Treatment Position  sitting in chair/recliner  -      Post Treatment Position  chair  -SM      In Chair  reclined;call light within reach;encouraged to call for assist;with family/caregiver  -SM      Recorded by [SM] Bonny Arriaga, [facility] 11/13/18 1401      Row Name 11/13/18 0930             Pain Assessment    Additional Documentation  Pain Scale: Numbers Pre/Post-Treatment (Group)  -SM      Recorded by [SM] Bonny Arriaga, [facility] 11/13/18 1401      Row Name 11/13/18  0930             Pain Scale: Numbers Pre/Post-Treatment    Pain Scale: Numbers, Pretreatment  3/10  -SM      Pain Scale: Numbers, Post-Treatment  4/10  -SM      Pain Location - Side  Right  -SM      Pain Location  knee  -SM      Pain Intervention(s)  Repositioned;Ambulation/increased activity;Rest  -SM      Recorded by [SM] Bonny Arriaga, HOLLI 11/13/18 1401      Row Name                Wound 11/12/18 1024 Right knee incision    Wound - Properties Group Date first assessed: 11/12/18 [RG] Time first assessed: 1024 [RG] Side: Right [RG] Location: knee [RG] Type: incision [RG] Recorded by:  [RG] Roshni Duff RN 11/12/18 1024      User Key  (r) = Recorded By, (t) = Taken By, (c) = Cosigned By    Initials Name Effective Dates Discipline    RG Roshni Duff RN 06/16/16 -  Nurse    SM Bonny Arriaga, HOLLI 03/07/18 -  PT          Wound 11/12/18 1024 Right knee incision (Active)   Dressing Appearance dry;intact;no drainage 11/13/2018  8:00 AM   Closure GAEL 11/13/2018  8:00 AM   Base clean;dry 11/13/2018  5:50 AM   Drainage Amount none 11/13/2018  8:00 AM           Physical Therapy Education     Title: PT OT SLP Therapies (Resolved)     Topic: Physical Therapy (Resolved)     Point: Mobility training (Resolved)     Learning Progress Summary           Patient Acceptance, E,TB,D, VU by  at 11/13/2018  2:01 PM    Acceptance, E,D, VU,NR by MS at 11/12/2018  2:49 PM                   Point: Home exercise program (Resolved)     Learning Progress Summary           Patient Acceptance, E,TB,D, VU by  at 11/13/2018  2:01 PM    Acceptance, E,D, VU,NR by MS at 11/12/2018  2:49 PM                   Point: Body mechanics (Resolved)     Learning Progress Summary           Patient Acceptance, E,TB,D, VU by  at 11/13/2018  2:01 PM    Acceptance, E,D, VU,NR by MS at 11/12/2018  2:49 PM                   Point: Precautions (Resolved)     Learning Progress Summary           Patient Acceptance, E,TB,D, VU by  at  11/13/2018  2:01 PM    Acceptance, E,D, VU,NR by MS at 11/12/2018  2:49 PM                               User Key     Initials Effective Dates Name Provider Type Discipline    MS 04/03/18 -  Keon [facility] TRINIDAD, PT Physical Therapist PT     03/07/18 -  Bonny Arriaga PTA Physical Therapy Assistant PT                PT Recommendation and Plan     Plan of Care Reviewed With: patient  Progress: improving  Outcome Measures     Row Name 11/13/18 1400 11/12/18 1400          How much help from another person do you currently need...    Turning from your back to your side while in flat bed without using bedrails?  4  -SM  4  -MS     Moving from lying on back to sitting on the side of a flat bed without bedrails?  4  -SM  4  -MS     Moving to and from a bed to a chair (including a wheelchair)?  3  -SM  3  -MS     Standing up from a chair using your arms (e.g., wheelchair, bedside chair)?  3  -SM  3  -MS     Climbing 3-5 steps with a railing?  3  -SM  3  -MS     To walk in hospital room?  3  -SM  3  -MS     AM-PAC 6 Clicks Score  20  -SM  20  -MS        Functional Assessment    Outcome Measure Options  AM-PAC 6 Clicks Basic Mobility (PT)  -SM  AM-PAC 6 Clicks Basic Mobility (PT)  -MS       User Key  (r) = Recorded By, (t) = Taken By, (c) = Cosigned By    Initials Name Provider Type    Fausto Montes VIVI, PT Physical Therapist     Bonny Arriaga PTA Physical Therapy Assistant         Time Calculation:   PT Charges     Row Name 11/13/18 1402             Time Calculation    Start Time  0930  -      Stop Time  1015  -      Time Calculation (min)  45 min  -      PT Received On  11/13/18  -      PT - Next Appointment  11/13/18  -        User Key  (r) = Recorded By, (t) = Taken By, (c) = Cosigned By    Initials Name Provider Type    Bonny Mcnair PTA Physical Therapy Assistant        Therapy Suggested Charges     Code   Minutes Charges    None           Therapy Charges for Today     Code  Description Service Date Service Provider Modifiers Qty    71774670584 HC PT THER PROC EA 15 MIN 11/13/2018 Bonny Arriaga, PTA GP 1    96725513630 HC PT THER PROC GROUP 11/13/2018 Bonny Arriaga, HOLLI GP 1          PT G-Codes  PT Professional Judgement Used?: Yes  Outcome Measure Options: AM-PAC 6 Clicks Basic Mobility (PT)  AM-PAC 6 Clicks Score: 20  Functional Limitation: Mobility: Walking and moving around  Mobility: Walking and Moving Around Current Status (): At least 20 percent but less than 40 percent impaired, limited or restricted  Mobility: Walking and Moving Around Goal Status (): 0 percent impaired, limited or restricted    Bonny Arriaga PTA  11/13/2018

## 2018-11-13 NOTE — PROGRESS NOTES
Discharge Planning Assessment  [facility]     Patient Name: Komal Sheffield  MRN: [id number]  Today's Date: 11/13/2018    Admit Date: 11/12/2018    Discharge Needs Assessment     Row Name 11/13/18 1028       Living Environment    Lives With  spouse    Current Living Arrangements  home/apartment/condo    Family Caregiver if Needed  spouse    Quality of Family Relationships  helpful;involved;supportive    Able to Return to Prior Arrangements  yes       Resource/Environmental Concerns    Resource/Environmental Concerns  none       Transition Planning    Patient/Family Anticipates Transition to  home with family    Patient/Family Anticipated Services at Transition  home health care    Transportation Anticipated  family or friend will provide       Discharge Needs Assessment    Readmission Within the Last 30 Days  no previous admission in last 30 days    Concerns to be Addressed  denies needs/concerns at this time    Equipment Currently Used at Home  glucometer    Equipment Needed After Discharge  walker, rolling;raised toilet    Discharge Facility/Level of Care Needs  home with home health    Offered/Gave Vendor List  yes        Discharge Plan     Row Name 11/13/18 1031       Plan    Plan  Home w/ spouse and Merged with [facility]     Patient/Family in Agreement with Plan  yes    Plan Comments  Met w/ pt at the bedside, verified facesheet and d/c plan. Pt plans to d/c home w/ her spouse, she would like Merged with [facility]. Bill/Merged with [facility] notified.     Final Discharge Disposition Code  01 - home or self-care        Destination      No service coordination in this encounter.      Durable Medical Equipment      No service coordination in this encounter.      Dialysis/Infusion      No service coordination in this encounter.      Home Medical Care - Selection Complete      Service Provider Request Status Selected Services Address Phone Number Fax Number    [facility] [place] [facility] Services [address]  [id number] [phone]      [facility]      No service coordination in this encounter.        Expected Discharge Date and Time     Expected Discharge Date Expected Discharge Time    Nov 13, 2018         Demographic Summary    No documentation.       Functional Status     Row Name 11/13/18 1029       Functional Status    Usual Activity Tolerance  good    Current Activity Tolerance  fair       Functional Status, IADL    Medications  independent    Meal Preparation  independent    Housekeeping  independent    Laundry  independent    Shopping  independent       Mental Status    General Appearance WDL  WDL       Mental Status Summary    Recent Changes in Mental Status/Cognitive Functioning  no changes        Psychosocial    No documentation.       Abuse/Neglect    No documentation.       Legal    No documentation.       Substance Abuse    No documentation.       Patient Forms     Row Name 11/13/18 1028       Patient Forms    Provider Choice List  Delivered    Delivered to  Patient    Method of delivery  In person            Jenny Jorge RN

## 2018-11-27 ENCOUNTER — OFFICE VISIT (OUTPATIENT)
Dept: ORTHOPEDIC SURGERY | Facility: CLINIC | Age: 73
End: 2018-11-27

## 2018-11-27 DIAGNOSIS — Z96.651 HISTORY OF TOTAL RIGHT KNEE REPLACEMENT: Primary | ICD-10-CM

## 2018-11-27 PROCEDURE — 99024 POSTOP FOLLOW-UP VISIT: CPT | Performed by: ORTHOPAEDIC SURGERY

## 2018-11-27 RX ORDER — HYDROCODONE BITARTRATE AND ACETAMINOPHEN 7.5; 325 MG/1; MG/1
TABLET ORAL
Qty: 50 TABLET | Refills: 0 | Status: SHIPPED | OUTPATIENT
Start: 2018-11-27 | End: 2020-02-11

## 2018-11-27 NOTE — PROGRESS NOTES
Komal Sheffield : 1945 MRN: [id number] DATE: 2018    DIAGNOSIS: 2 week follow up right total knee      SUBJECTIVE:Patient returns today for 2 week follow up of right total knee replacement. Patient reports doing well with no unusual complaints. Appears to be progressing appropriately.    OBJECTIVE:   Exam:. The incision is healing appropriately. No sign of infection. Range of motion is progressing as expected. The calf is soft and nontender with a negative Homans sign.    ASSESSMENT: 2 week status post right knee replacement.    PLAN: 1) Staples removed and steri strips applied   2) Order given for PT   3) Discontinue ISACC hose   4) Continue ice PRN   5) aspirin 325 mg orally every day for 1 month   6) Follow up in 6 weeks with repeat Xrays of right knee (3views)    Valerio Barakat MD  2018

## 2018-12-03 ENCOUNTER — TREATMENT (OUTPATIENT)
Dept: PHYSICAL THERAPY | Facility: CLINIC | Age: 73
End: 2018-12-03

## 2018-12-03 DIAGNOSIS — M17.11 PRIMARY OSTEOARTHRITIS OF RIGHT KNEE: Primary | ICD-10-CM

## 2018-12-03 DIAGNOSIS — R26.2 DIFFICULTY WALKING: ICD-10-CM

## 2018-12-03 PROCEDURE — G8979 MOBILITY GOAL STATUS: HCPCS | Performed by: PHYSICAL THERAPIST

## 2018-12-03 PROCEDURE — 97110 THERAPEUTIC EXERCISES: CPT | Performed by: PHYSICAL THERAPIST

## 2018-12-03 PROCEDURE — 97164 PT RE-EVAL EST PLAN CARE: CPT | Performed by: PHYSICAL THERAPIST

## 2018-12-03 PROCEDURE — G8978 MOBILITY CURRENT STATUS: HCPCS | Performed by: PHYSICAL THERAPIST

## 2018-12-03 NOTE — PROGRESS NOTES
Re-Assessment / Re-Certification      Patient: Komal Sheffield   : 1945  Diagnosis/ICD-10 Code:  Primary osteoarthritis of right knee [M17.11]  Referring practitioner: Valerio Barakat MD  Date of Initial Visit: 12/3/2018  Today's Date: 12/3/2018  Patient seen for 6 sessions      Subjective:   Komal Sheffield reports: Pt reports she is doing okay, but still has a upset stomach and can't eat without getting sick.  Pt states she is not taking her pain meds on a regular bases and is doing fine.  Pt states she is taking them prior to coming to PT or doing her HEP.    Subjective Questionnaire: LEFS: 26%  Clinical Progress: worse secondary to pt having surgery  Home Program Compliance: Yes  Treatment has included: therapeutic exercise, neuromuscular re-education, manual therapy, therapeutic activity, gait training, electrical stimulation, cryotherapy and HEP    Subjective   Objective       Observations     Right Knee   Positive for incision.     Additional Observation Details  Pt with right anterior knee incision with steri stripes intact.  Pt with minimal superior knee effusion.  Pt without signs of infection.      Neurological Testing     Sensation     Knee   Left Knee   Intact: light touch    Right Knee   Intact: light touch     Active Range of Motion     Right Knee   Flexion: 85 degrees   Extension: 11 degrees     Additional Active Range of Motion Details  Pt's ROM after stretching 94 degrees   Right knee Ext 5 degrees     Patellar Mobility     Right Knee Patellar tendons within functional limits include the medial and lateral. Hypomobile in the superior and inferior patellar tendon(s).     Strength/Myotome Testing     Left Hip   Planes of Motion   Flexion: 5  Abduction: 5  Adduction: 5    Right Hip   Planes of Motion   Flexion: 4  Abduction: 4  Adduction: 4    Left Knee   Flexion: 5  Extension: 5    Right Knee   Flexion: 4  Extension: 4+    Tests     Left Hip   90/90 SLR: Negative.   SLR: Negative.     Right Hip   90/90  SLR: Negative.  SLR: Positive.     Ambulation     Observational Gait   Gait: antalgic   Decreased walking speed, stride length, right stance time, right swing time and right step length.   Right foot contact pattern: heel to toe    Additional Observational Gait Details  Pt enters department WBAT R LE with walker.       Assessment & Plan     Assessment  Impairments: abnormal gait, abnormal or restricted ROM, impaired balance, impaired physical strength, lacks appropriate home exercise program, pain with function and weight-bearing intolerance  Assessment details: Pt is a 72 y/o WF who reports to the clinic with c/o right  knee pain, decreased flexibility, tenderness, knee effusion, and decreased functional mobility.      Prognosis: good  Functional Limitations: walking, uncomfortable because of pain and standing  Goals  Plan Goals: STGs: 2-3 weeks  1. Decrease right knee pain 4/10 at it's worst  2. Increase right knee AROM 0-110 degrees   3.  Decrease right knee medial knee tenderness to minimal to none with palpation.   4.  Increase right hamstring flexibility to WNL with SLR test    5.  Pt ambulates into clinic with an single point cane minimal antalgic gait right LE.    LTGs: 4-6 weeks  1.  Pt Independent with HEP.  2.  Increase right knee AROM 0-120 degrees   3.  Increase right knee and hip strength to 5/5  4.  Pt ambulates right LE without an antalgic gait.  5.  Decrease right knee pain to a 1-2/10 at it's worst     Plan  Therapy options: will be seen for skilled physical therapy services  Planned modality interventions: cryotherapy, electrical stimulation/Russian stimulation and thermotherapy (hydrocollator packs)  Planned therapy interventions: joint mobilization, home exercise program, gait training, flexibility, strengthening, stretching, functional ROM exercises and therapeutic activities  Duration in visits: 20  Treatment plan discussed with: patient      Progress toward previous goals: All Met       PT  Signature: Alondra Burris PT      Based upon review of the patient's progress and continued therapy plan, it is my medical opinion that Komal Sheffield should continue physical therapy treatment at [facility]  [address] [place] [id number] [phone].    Signature: __________________________________  Valerio Barakat MD      Manual Therapy:         mins  01335;  Therapeutic Exercise:    12     mins  60127;     Neuromuscular Fina:        mins  74205;    Therapeutic Activity:          mins  24281;     Gait Training:           mins  29471;     Ultrasound:          mins  15355;    Electrical Stimulation:         mins  31840 ( );  Dry Needling          mins self-pay    Timed Treatment:  12    mins   Total Treatment:     63   mins

## 2018-12-05 ENCOUNTER — TREATMENT (OUTPATIENT)
Dept: PHYSICAL THERAPY | Facility: CLINIC | Age: 73
End: 2018-12-05

## 2018-12-05 DIAGNOSIS — R26.2 DIFFICULTY WALKING: ICD-10-CM

## 2018-12-05 DIAGNOSIS — M17.11 PRIMARY OSTEOARTHRITIS OF RIGHT KNEE: Primary | ICD-10-CM

## 2018-12-05 PROCEDURE — 97110 THERAPEUTIC EXERCISES: CPT | Performed by: PHYSICAL THERAPIST

## 2018-12-05 PROCEDURE — 97140 MANUAL THERAPY 1/> REGIONS: CPT | Performed by: PHYSICAL THERAPIST

## 2018-12-05 NOTE — PROGRESS NOTES
"Physical Therapy Daily Progress Note    Visit # : 7  Komal Sheffield reports: she is doing okay, but her stomach is still bothering her and not sure why?  Pt states she can't get over this feeling of nausea.  Pt also states her knee is hurting a little more today.      Subjective     Objective       Active Range of Motion     Right Knee   Flexion: 107 degrees   Extension: 0 degrees      See Exercise, Manual, and Modality Logs for complete treatment.       Assessment & Plan     Assessment  Assessment details: Pt continues to respond to treatment with improved knee AROM in flex and ext.  Pt has limited tolerance to some of the exercises secondary to her c/o feeling \"nauseated.\"  Educated pt on monitoring her symptoms with taking her pain meds and encouraged pt to call MD.  Will continue to see 3x/week for stretching, strengthening, and modalities PRN for pain.  Add nustep and sidelying hip add/abd next visit.          Progress per Plan of Care           Manual Therapy:    10     mins  03933;  Therapeutic Exercise:    20     mins  32976;     Neuromuscular Fina:        mins  51552;    Therapeutic Activity:          mins  00979;     Gait Training:           mins  28654;     Ultrasound:          mins  99043;    Electrical Stimulation:         mins  28021 ( );  Dry Needling          mins self-pay    Timed Treatment:   30   mins   Total Treatment:     80   mins    Alondra Burris, PT  Physical Therapist  "

## 2018-12-07 ENCOUNTER — TREATMENT (OUTPATIENT)
Dept: PHYSICAL THERAPY | Facility: CLINIC | Age: 73
End: 2018-12-07

## 2018-12-07 DIAGNOSIS — Z96.651 S/P TOTAL KNEE ARTHROPLASTY, RIGHT: ICD-10-CM

## 2018-12-07 DIAGNOSIS — M17.11 PRIMARY OSTEOARTHRITIS OF RIGHT KNEE: Primary | ICD-10-CM

## 2018-12-07 DIAGNOSIS — R26.2 DIFFICULTY WALKING: ICD-10-CM

## 2018-12-07 PROCEDURE — 97140 MANUAL THERAPY 1/> REGIONS: CPT | Performed by: PHYSICAL THERAPIST

## 2018-12-07 PROCEDURE — 97110 THERAPEUTIC EXERCISES: CPT | Performed by: PHYSICAL THERAPIST

## 2018-12-07 NOTE — PROGRESS NOTES
Physical Therapy Daily Progress Note    Visit # : 8  Komal Sheffield reports: her stomach is getting better, but did not get to do her HEP yesterday.  Started drinking poweraid and taking imodium and it is helping her stomach.      Subjective     Objective       Active Range of Motion     Right Knee   Flexion: 108 degrees   Extension: 0 degrees      See Exercise, Manual, and Modality Logs for complete treatment.       Assessment & Plan     Assessment  Assessment details: Pt continues to respond to treatment with improved knee AROM in flex and ext.  Pt with increased tolerance to strengthening exercises today due to pt's improved stomach issues.  Will continue to see 3x/week for stretching, strengthening, and modalities PRN for pain.         Progress per Plan of Care           Manual Therapy:    10     mins  21113;  Therapeutic Exercise:    30     mins  24775;     Neuromuscular Fina:        mins  39712;    Therapeutic Activity:          mins  62163;     Gait Training:           mins  50181;     Ultrasound:          mins  74854;    Electrical Stimulation:         mins  95251 ( );  Dry Needling          mins self-pay    Timed Treatment:   40   mins   Total Treatment:    80    mins    Alondra Burris, PT  Physical Therapist

## 2018-12-10 ENCOUNTER — TELEPHONE (OUTPATIENT)
Dept: ORTHOPEDIC SURGERY | Facility: CLINIC | Age: 73
End: 2018-12-10

## 2018-12-10 NOTE — TELEPHONE ENCOUNTER
POST OP-RBB-TKA ON 11/12/18    Been on Hydrocodone and says giving her nausea x 2 weeks. PATIENT ASKED TO SPEAK WITH SOMEONE TODAY.    RBB IN OR-PLEASE SEND TO OTHER MD

## 2018-12-10 NOTE — TELEPHONE ENCOUNTER
Call return to the patient.  She is now having some problems with nausea after taking the hydrocodone.  Does have some Zofran at home but is only taking that when she's severely nauseated.  Have advised her to discontinue the code and take Tylenol for pain.  She can use her Zofran as needed for the nausea.  Have left it open for her to call if she should need a refill on Zofran or if the Tylenol alone does not help the pain per RBB

## 2018-12-12 ENCOUNTER — TREATMENT (OUTPATIENT)
Dept: PHYSICAL THERAPY | Facility: CLINIC | Age: 73
End: 2018-12-12

## 2018-12-12 DIAGNOSIS — Z96.651 S/P TOTAL KNEE ARTHROPLASTY, RIGHT: ICD-10-CM

## 2018-12-12 DIAGNOSIS — R26.2 DIFFICULTY WALKING: ICD-10-CM

## 2018-12-12 DIAGNOSIS — M17.11 PRIMARY OSTEOARTHRITIS OF RIGHT KNEE: Primary | ICD-10-CM

## 2018-12-12 PROCEDURE — 97110 THERAPEUTIC EXERCISES: CPT | Performed by: PHYSICAL THERAPIST

## 2018-12-12 PROCEDURE — 97140 MANUAL THERAPY 1/> REGIONS: CPT | Performed by: PHYSICAL THERAPIST

## 2018-12-12 NOTE — PROGRESS NOTES
Physical Therapy Daily Progress Note    Visit # : 9  Komal Sheffield reports: her knee is better and MD stated to stop taking the pain meds.  Pt is now taking Tylenol once per day when she is doing her HEP.      Subjective     Objective       Active Range of Motion     Right Knee   Flexion: 115 degrees   Extension: 2 degrees      See Exercise, Manual, and Modality Logs for complete treatment.       Assessment & Plan     Assessment  Assessment details: Pt continues to respond to treatment with improved knee AROM in flex and ext.  Pt with increased tolerance to strengthening exercises today due to pt's improved stomach issues.  Will continue to see 3x/week for stretching, strengthening, and modalities PRN for pain.         Progress per Plan of Care           Manual Therapy:   10      mins  08203;  Therapeutic Exercise:   30      mins  36078;     Neuromuscular Fina:        mins  94274;    Therapeutic Activity:          mins  20925;     Gait Training:           mins  56590;     Ultrasound:          mins  86679;    Electrical Stimulation:         mins  57165 ( );  Dry Needling          mins self-pay    Timed Treatment:   40   mins   Total Treatment:     80   mins    Alondra Burris, PT  Physical Therapist

## 2018-12-13 RX ORDER — ONDANSETRON 4 MG/1
4 TABLET, FILM COATED ORAL EVERY 6 HOURS PRN
Qty: 10 TABLET | Refills: 0 | Status: SHIPPED | OUTPATIENT
Start: 2018-12-13 | End: 2020-02-11

## 2018-12-14 ENCOUNTER — TREATMENT (OUTPATIENT)
Dept: PHYSICAL THERAPY | Facility: CLINIC | Age: 73
End: 2018-12-14

## 2018-12-14 DIAGNOSIS — R26.2 DIFFICULTY WALKING: ICD-10-CM

## 2018-12-14 DIAGNOSIS — M17.11 PRIMARY OSTEOARTHRITIS OF RIGHT KNEE: Primary | ICD-10-CM

## 2018-12-14 PROCEDURE — 97140 MANUAL THERAPY 1/> REGIONS: CPT | Performed by: PHYSICAL THERAPIST

## 2018-12-14 PROCEDURE — 97110 THERAPEUTIC EXERCISES: CPT | Performed by: PHYSICAL THERAPIST

## 2018-12-14 NOTE — PROGRESS NOTES
Physical Therapy Daily Progress Note    Visit # : 10  Komal Sheffield reports: she was really sore after last visit, but it was the first visit since she has been off her pain meds.  Pt states she was able to control the soreness with her Tylenol.      Subjective     Objective       Active Range of Motion     Right Knee   Flexion: 115 degrees   Extension: 1 degrees     Ambulation     Observational Gait   Gait: antalgic   Right swing time within functional limits. Decreased walking speed, stride length, right stance time and right step length.   Right foot contact pattern: foot flat    Additional Observational Gait Details  Pt enters department WBAT RLE minimal antalgic gait using a single point cane.       See Exercise, Manual, and Modality Logs for complete treatment.       Assessment & Plan     Assessment  Assessment details: Pt continues to respond to treatment with improved knee extension ROM today.  Pt had some increased knee flexion tightness, but still functional ROM.  Pt continues to tolerate all strengthening exercises without increased pain.   Will continue to see 3x/week for stretching, strengthening, and modalities PRN for pain.  Pt will need a re-eval next visit.          Progress per Plan of Care           Manual Therapy:   10      mins  62041;  Therapeutic Exercise:   20      mins  75885;     Neuromuscular Fina:        mins  37140;    Therapeutic Activity:          mins  92899;     Gait Training:           mins  10712;     Ultrasound:          mins  05818;    Electrical Stimulation:         mins  64910 ( );  Dry Needling         mins self-pay    Timed Treatment:  30    mins   Total Treatment:     75   mins    Alondra Burris, PT  Physical Therapist

## 2018-12-18 ENCOUNTER — TREATMENT (OUTPATIENT)
Dept: PHYSICAL THERAPY | Facility: CLINIC | Age: 73
End: 2018-12-18

## 2018-12-18 DIAGNOSIS — R26.2 DIFFICULTY WALKING: ICD-10-CM

## 2018-12-18 DIAGNOSIS — Z96.651 S/P TOTAL KNEE ARTHROPLASTY, RIGHT: ICD-10-CM

## 2018-12-18 DIAGNOSIS — M17.11 PRIMARY OSTEOARTHRITIS OF RIGHT KNEE: Primary | ICD-10-CM

## 2018-12-18 PROCEDURE — G8979 MOBILITY GOAL STATUS: HCPCS | Performed by: PHYSICAL THERAPIST

## 2018-12-18 PROCEDURE — 97140 MANUAL THERAPY 1/> REGIONS: CPT | Performed by: PHYSICAL THERAPIST

## 2018-12-18 PROCEDURE — G8978 MOBILITY CURRENT STATUS: HCPCS | Performed by: PHYSICAL THERAPIST

## 2018-12-18 PROCEDURE — 97110 THERAPEUTIC EXERCISES: CPT | Performed by: PHYSICAL THERAPIST

## 2018-12-18 NOTE — PROGRESS NOTES
Re-Assessment / Re-Certification    Patient: Komal Sheffield   : 1945  Diagnosis/ICD-10 Code:  Primary osteoarthritis of right knee [M17.11]  Referring practitioner: Valerio Barakat MD  Date of Initial Visit: 2018  Today's Date: 2018  Patient seen for 11 sessions      Subjective:   Komal Sheffield reports: her right knee pain on a daily bases is a 2/10.    Subjective Questionnaire: LEFS: 31%  Clinical Progress: improved  Home Program Compliance: Yes  Treatment has included: therapeutic exercise, manual therapy, therapeutic activity, gait training, cryotherapy and HEP    Subjective   Objective       Active Range of Motion     Right Knee   Flexion: 120 degrees   Extension: 2 degrees     Strength/Myotome Testing     Right Hip   Planes of Motion   Flexion: 4  Abduction: 4+  Adduction: 4+    Right Knee   Flexion: 4+  Extension: 5  Quadriceps contraction: good    Ambulation     Observational Gait   Gait: within functional limits   Right swing time within functional limits. Decreased walking speed, stride length, right stance time and right step length.   Right foot contact pattern: heel to toe    Additional Observational Gait Details  Pt ambulating WBAT right LE using a single point cane minimal antalgic gait.       Assessment & Plan     Assessment  Assessment details: Pt is doing well and progressing with therapy.  Pt has improving strength, ROM, decreasing pain, and improving functional mobility.  Pt has met 4/5 STGs and 1/5 LTGs.  Pt needs more hip and quad strengthening for improved ability to ambulate without an assistive device and for doing steps.  Will continue to see 2-3x/week for strengthening, gait, and ROM.        Progress toward previous goals: Partially Met    Plan Goals: STGs: 2-3 weeks  1. Decrease right knee pain 4/10 at it's worst-MET  2. Increase right knee AROM 0-110 degrees -MET  3.  Decrease right knee medial knee tenderness to minimal to none with palpation. -MET  4.  Increase right  hamstring flexibility to WNL with SLR test -PROGRESSING  5.  Pt ambulates into clinic with an single point cane minimal antalgic gait right LE.-MET    LTGs: 4-6 weeks  1.  Pt Independent with HEP.MET  2.  Increase right knee AROM 0-120 degrees NOT MET  3.  Increase right knee and hip strength to 5/5 NOT MET  4.  Pt ambulates right LE without an antalgic gait. NOT MET  5.  Decrease right knee pain to a 1-2/10 at it's worst   NOT MET        Recommendations: Continue as planned  Timeframe: 1 month  Prognosis to achieve goals: good    PT Signature: Alondra Burris, PT      Based upon review of the patient's progress and continued therapy plan, it is my medical opinion that Komal Sheffield should continue physical therapy treatment at [facility]  [address] [place] [id number] [phone].    Signature: __________________________________  Valerio Barakat MD    Manual Therapy:  10       mins  49423;  Therapeutic Exercise:   20      mins  62274;     Neuromuscular Fina:        mins  56563;    Therapeutic Activity:          mins  89629;     Gait Training:           mins  93331;     Ultrasound:          mins  58340;    Electrical Stimulation:         mins  89515 ( );  Dry Needling          mins self-pay    Timed Treatment:  30    mins   Total Treatment:     73   mins

## 2018-12-19 ENCOUNTER — TREATMENT (OUTPATIENT)
Dept: PHYSICAL THERAPY | Facility: CLINIC | Age: 73
End: 2018-12-19

## 2018-12-19 DIAGNOSIS — M17.11 PRIMARY OSTEOARTHRITIS OF RIGHT KNEE: Primary | ICD-10-CM

## 2018-12-19 DIAGNOSIS — Z96.651 S/P TOTAL KNEE ARTHROPLASTY, RIGHT: ICD-10-CM

## 2018-12-19 DIAGNOSIS — R26.2 DIFFICULTY WALKING: ICD-10-CM

## 2018-12-19 PROCEDURE — 97140 MANUAL THERAPY 1/> REGIONS: CPT | Performed by: PHYSICAL THERAPIST

## 2018-12-19 PROCEDURE — 97110 THERAPEUTIC EXERCISES: CPT | Performed by: PHYSICAL THERAPIST

## 2018-12-19 NOTE — PROGRESS NOTES
Physical Therapy Daily Progress Note    Visit # : 12  Komal Sheffield reports: she is feeling better today.    Subjective     Objective       Active Range of Motion     Right Knee   Flexion: 120 degrees   Extension: 0 degrees      See Exercise, Manual, and Modality Logs for complete treatment.       Assessment & Plan     Assessment  Assessment details: Pt is doing better with decreasing c/o feeling nauseated so pt was able to increase her tolerance to exercises.  Pt with improving ROM in flexion and extension.  Pt tolerated B leg press and increasing her reps with steps.  Will continue to see pt 2-3x/week to push strengthening.          Progress per Plan of Care           Manual Therapy:   10      mins  89826;  Therapeutic Exercise:   30      mins  61817;     Neuromuscular Fina:        mins  35262;    Therapeutic Activity:          mins  93353;     Gait Training:           mins  62464;     Ultrasound:          mins  66906;    Electrical Stimulation:         mins  08117 ( );  Dry Needling          mins self-pay    Timed Treatment:  40    mins   Total Treatment:     90   mins    Alondra Burris, PT  Physical Therapist

## 2018-12-26 ENCOUNTER — TREATMENT (OUTPATIENT)
Dept: PHYSICAL THERAPY | Facility: CLINIC | Age: 73
End: 2018-12-26

## 2018-12-26 DIAGNOSIS — Z96.651 S/P TOTAL KNEE ARTHROPLASTY, RIGHT: ICD-10-CM

## 2018-12-26 DIAGNOSIS — R26.2 DIFFICULTY WALKING: ICD-10-CM

## 2018-12-26 DIAGNOSIS — M17.11 PRIMARY OSTEOARTHRITIS OF RIGHT KNEE: Primary | ICD-10-CM

## 2018-12-26 PROCEDURE — 97110 THERAPEUTIC EXERCISES: CPT | Performed by: PHYSICAL THERAPIST

## 2018-12-26 PROCEDURE — 97530 THERAPEUTIC ACTIVITIES: CPT | Performed by: PHYSICAL THERAPIST

## 2018-12-26 NOTE — PROGRESS NOTES
Physical Therapy Daily Progress Note    Visit # : 13  Komal Sheffield reports: her knee is feeling better and denies having any issues over the weekend.      Subjective     Objective       Active Range of Motion     Right Knee   Flexion: 123 degrees   Extension: 0 degrees      See Exercise, Manual, and Modality Logs for complete treatment.       Assessment & Plan     Assessment  Assessment details: Pt is responding well to treatment with improving ROM and strength.  Pt tolerating all CKC exercises with minimal difficulty, so plan to increase step height and add step up and over next visit.  Will continue to see pt 2-3x/week to push strengthening.          Progress per Plan of Care           Manual Therapy:  10       mins  54906;  Therapeutic Exercise:   30      mins  28729;     Neuromuscular Fina:        mins  88439;    Therapeutic Activity:   10       mins  37735;     Gait Training:           mins  07665;     Ultrasound:          mins  62629;    Electrical Stimulation:         mins  55579 ( );  Dry Needling          mins self-pay    Timed Treatment:  50    mins   Total Treatment:    70    mins    Alondra Burris, PT  Physical Therapist

## 2018-12-27 ENCOUNTER — TREATMENT (OUTPATIENT)
Dept: PHYSICAL THERAPY | Facility: CLINIC | Age: 73
End: 2018-12-27

## 2018-12-27 DIAGNOSIS — M17.11 PRIMARY OSTEOARTHRITIS OF RIGHT KNEE: Primary | ICD-10-CM

## 2018-12-27 DIAGNOSIS — R26.2 DIFFICULTY WALKING: ICD-10-CM

## 2018-12-27 DIAGNOSIS — Z96.651 S/P TOTAL KNEE ARTHROPLASTY, RIGHT: ICD-10-CM

## 2018-12-27 PROCEDURE — 97140 MANUAL THERAPY 1/> REGIONS: CPT | Performed by: PHYSICAL THERAPIST

## 2018-12-27 PROCEDURE — 97110 THERAPEUTIC EXERCISES: CPT | Performed by: PHYSICAL THERAPIST

## 2018-12-27 NOTE — PROGRESS NOTES
Physical Therapy Daily Progress Note    Visit # : 14  Komal Sheffield reports: her knee ached all night, but feels better this morning.      Subjective     Objective       Active Range of Motion     Right Knee   Flexion: 123 degrees   Extension: 0 degrees      See Exercise, Manual, and Modality Logs for complete treatment.       Assessment & Plan     Assessment  Assessment details: Pt is responding well to treatment, but c/o increased pain yesterday.  Did not increase exercises today secondary to pt having two treatments in a row and c/o increased pain.    Pt tolerating all CKC exercises with minimal difficulty.  Will continue to see pt 2-3x/week to push strengthening.          Progress per Plan of Care           Manual Therapy:   10      mins  11176;  Therapeutic Exercise:   20      mins  55117;     Neuromuscular Fina:        mins  64747;    Therapeutic Activity:          mins  96362;     Gait Training:           mins  67334;     Ultrasound:          mins  17729;    Electrical Stimulation:         mins  28887 ( );  Dry Needling          mins self-pay    Timed Treatment:  30    mins   Total Treatment:     80   mins    Alondra Burris, PT  Physical Therapist

## 2018-12-31 ENCOUNTER — TREATMENT (OUTPATIENT)
Dept: PHYSICAL THERAPY | Facility: CLINIC | Age: 73
End: 2018-12-31

## 2018-12-31 DIAGNOSIS — R26.2 DIFFICULTY WALKING: ICD-10-CM

## 2018-12-31 DIAGNOSIS — M17.11 PRIMARY OSTEOARTHRITIS OF RIGHT KNEE: Primary | ICD-10-CM

## 2018-12-31 DIAGNOSIS — Z96.651 S/P TOTAL KNEE ARTHROPLASTY, RIGHT: ICD-10-CM

## 2018-12-31 PROCEDURE — 97140 MANUAL THERAPY 1/> REGIONS: CPT | Performed by: PHYSICAL THERAPIST

## 2018-12-31 PROCEDURE — 97110 THERAPEUTIC EXERCISES: CPT | Performed by: PHYSICAL THERAPIST

## 2018-12-31 NOTE — PROGRESS NOTES
Physical Therapy Daily Progress Note    Visit # : 15  Komal Sheffield reports: she is feeling better and states she got rid of her cane over the weekend.  States she did well yesterday going to [employer] and then her grandson's basketball game, but states by the time she got home she was really tired.      Subjective     Objective       Active Range of Motion     Right Knee   Flexion: 124 degrees   Extension: 0 degrees      See Exercise, Manual, and Modality Logs for complete treatment.       Assessment & Plan     Assessment  Assessment details: Pt is responding well to treatment with improving ROM, strength and functional mobility.  Pt enters department today without cane or antalgic gait.  Pt tolerated the 1# weight with all strengthening exercises.  Will continue to see pt 2-3x/week to push strengthening.          Progress per Plan of Care           Manual Therapy:    10     mins  03490;  Therapeutic Exercise:    20     mins  79283;     Neuromuscular Fina:        mins  71471;    Therapeutic Activity:          mins  00487;     Gait Training:           mins  30997;     Ultrasound:          mins  94924;    Electrical Stimulation:         mins  49316 ( );  Dry Needling          mins self-pay    Timed Treatment:   30   mins   Total Treatment:     79   mins    Alondra Burris, PT  Physical Therapist

## 2019-01-04 ENCOUNTER — TREATMENT (OUTPATIENT)
Dept: PHYSICAL THERAPY | Facility: CLINIC | Age: 74
End: 2019-01-04

## 2019-01-04 DIAGNOSIS — R26.2 DIFFICULTY WALKING: ICD-10-CM

## 2019-01-04 DIAGNOSIS — M17.11 PRIMARY OSTEOARTHRITIS OF RIGHT KNEE: Primary | ICD-10-CM

## 2019-01-04 DIAGNOSIS — Z96.651 S/P TOTAL KNEE ARTHROPLASTY, RIGHT: ICD-10-CM

## 2019-01-04 PROCEDURE — 97530 THERAPEUTIC ACTIVITIES: CPT | Performed by: PHYSICAL THERAPIST

## 2019-01-04 PROCEDURE — 97140 MANUAL THERAPY 1/> REGIONS: CPT | Performed by: PHYSICAL THERAPIST

## 2019-01-04 PROCEDURE — 97110 THERAPEUTIC EXERCISES: CPT | Performed by: PHYSICAL THERAPIST

## 2019-01-04 NOTE — PROGRESS NOTES
Physical Therapy Daily Progress Note    Visit # : 16  Komal Sheffield reports: I get tired when I'm on my feet alot    Subjective     Objective   See Exercise, Manual, and Modality Logs for complete treatment.   PROM Knee ext 0 degrees with OP  AAROM Knee flex 132 degrees with strap    Assessment/Plan  Pt requires verbal cuing to avoid extensor lag with SLR.  Pt exhibits excellent ROM and is making steady progress toward goals.   Progress per Plan of Care  Consider resisted band walks next visit.          Manual Therapy:    10     mins  26249;  Therapeutic Exercise:    35     mins  56042;     Neuromuscular Fina:    -    mins  35569;    Therapeutic Activity:     10     mins  29446;     Gait Training:      -     mins  98267;     Ultrasound:     -     mins  90881;    Electrical Stimulation:    -     mins  51422 ( );  Iontophoresis                 -     mins 87952      Timed Treatment:   55   mins direct  Total Treatment:     65   mins        Kalani Escamilla PT  Physical Therapist  [place] License # [id number]

## 2019-01-07 ENCOUNTER — TREATMENT (OUTPATIENT)
Dept: PHYSICAL THERAPY | Facility: CLINIC | Age: 74
End: 2019-01-07

## 2019-01-07 DIAGNOSIS — Z96.651 S/P TOTAL KNEE ARTHROPLASTY, RIGHT: ICD-10-CM

## 2019-01-07 DIAGNOSIS — R26.2 DIFFICULTY WALKING: ICD-10-CM

## 2019-01-07 DIAGNOSIS — M17.11 PRIMARY OSTEOARTHRITIS OF RIGHT KNEE: Primary | ICD-10-CM

## 2019-01-07 PROCEDURE — 97110 THERAPEUTIC EXERCISES: CPT | Performed by: PHYSICAL THERAPIST

## 2019-01-07 PROCEDURE — 97140 MANUAL THERAPY 1/> REGIONS: CPT | Performed by: PHYSICAL THERAPIST

## 2019-01-07 NOTE — PROGRESS NOTES
Physical Therapy Progress Note  1/7/2019  Valerio Barakat MD    Re: Komal Sheffield    _______________________________________________________________    Mrs. Komal Sheffield, Patient seen for 17 sessions.  Treatment has consisted of: PROM, AROM, AAROM, RROM, gait training, HEP, and cold packs post exercise.      S: Mrs. Komal Sheffield states: she noticed today that her knee was popping when she is walking.  Pt rates her right knee pain on a daily average was a 1/10.      Subjective     Objective       Active Range of Motion     Right Knee   Flexion: 130 degrees   Extension: 0 degrees     Patellar Mobility     Right Knee Patellar tendons within functional limits include the medial, lateral, superior and inferior.     Strength/Myotome Testing     Right Hip   Planes of Motion   Flexion: 4+  Abduction: 4+  Adduction: 5    Right Knee   Flexion: 5  Extension: 4+  Quadriceps contraction: good     See Exercise, Manual, and Modality Logs for complete treatment.       Assessment & Plan     Assessment  Assessment details: Pt is progressing well with PT.  Pt is ambulating without an assistive device or antalgic gait, pt has good AROM, and improving strength.  Pt is c/o her right patella popping with ambulation, but feel this is due to quad weakness.  Pt could benefit from additional PT for hip and quad strengthening, but please advise.          Progress per Plan of Care           Manual Therapy:  10       mins  28486;  Therapeutic Exercise:  20       mins  10291;     Neuromuscular Fina:        mins  23138;    Therapeutic Activity:          mins  65583;     Gait Training:           mins  13662;     Ultrasound:          mins  46077;    Electrical Stimulation:         mins  16323 ( );  Dry Needling          mins self-pay    Timed Treatment:  30    mins   Total Treatment:    78    mins    Alondra Burris, PT  Physical Therapist

## 2019-01-07 NOTE — PROGRESS NOTES
Physical Therapy Daily Progress Note    Time In   Time Out ***    Visit # : 17  Komal Sheffield reports: ***    Subjective     Objective   See Exercise, Manual, and Modality Logs for complete treatment.       Assessment/Plan    {AMB PT PLAN (SOAP Note):81583}           Manual Therapy:    ***     mins  95139;  Therapeutic Exercise:    ***     mins  63077;     Neuromuscular Fina:    ***    mins  89884;    Therapeutic Activity:     ***     mins  93077;     Gait Training:      ***     mins  11161;     Ultrasound:     ***     mins  52189;    Electrical Stimulation:    ***     mins  80568 ( );  Dry Needling     ***     mins self-pay    Timed Treatment:   ***   mins   Total Treatment:     ***   mins    Alondra Burris, PT  Physical Therapist

## 2019-01-08 ENCOUNTER — OFFICE VISIT (OUTPATIENT)
Dept: ORTHOPEDIC SURGERY | Facility: CLINIC | Age: 74
End: 2019-01-08

## 2019-01-08 DIAGNOSIS — Z96.651 STATUS POST TOTAL RIGHT KNEE REPLACEMENT: Primary | ICD-10-CM

## 2019-01-08 PROCEDURE — 99024 POSTOP FOLLOW-UP VISIT: CPT | Performed by: NURSE PRACTITIONER

## 2019-01-08 PROCEDURE — 73562 X-RAY EXAM OF KNEE 3: CPT | Performed by: NURSE PRACTITIONER

## 2019-01-08 NOTE — PROGRESS NOTES
Komal Sheffield : 1945 MRN: [id number] DATE: 2019    DIAGNOSIS: 8 week follow up right total knee      SUBJECTIVE:Patient returns today for 8 week follow up of right total knee replacement. Patient reports doing well with no unusual complaints. Appears to be progressing appropriately.    OBJECTIVE:   Exam:. The incision is well healed. No sign of infection. Range of motion is measured at 0 to 120. The calf is soft and nontender with a negative Homans sign. Strength is progressing and the patient is ambulating appropriately.    DIAGNOSTIC STUDIES  Xrays: 3 views of the right knee (AP, lateral, and sunrise) were ordered and reviewed for evaluation of recent knee replacement. They demonstrate a well positioned, well aligned knee replacement without complicating factors noted. In comparison with previous films there has been no change.    ASSESSMENT: 8 week status post right knee replacement.    PLAN: 1) Continue with PT exercises as prescribed   2) Follow up in 10 months    Katina Dale, APRN  2019

## 2019-01-10 ENCOUNTER — TELEPHONE (OUTPATIENT)
Dept: ORTHOPEDIC SURGERY | Facility: CLINIC | Age: 74
End: 2019-01-10

## 2019-01-10 NOTE — TELEPHONE ENCOUNTER
Regarding: Non-Urgent Medical Question  Contact: [phone]  ----- Message from Mychart, Generic sent at 1/9/2019  9:19 PM EST -----  MY CHART MESSAGE:    Dr. Barakat wanted me to stop taking my calcium before surgery.  Could I start back taking calcium?  It's supposed to help with bone loss.    Komal Sheffield

## 2019-04-09 ENCOUNTER — OFFICE VISIT (OUTPATIENT)
Dept: ORTHOPEDIC SURGERY | Facility: CLINIC | Age: 74
End: 2019-04-09

## 2019-04-09 VITALS — WEIGHT: 194.8 LBS | BODY MASS INDEX: 34.52 KG/M2 | TEMPERATURE: 97.4 F | HEIGHT: 63 IN

## 2019-04-09 DIAGNOSIS — M25.562 ACUTE PAIN OF LEFT KNEE: Primary | ICD-10-CM

## 2019-04-09 PROCEDURE — 20610 DRAIN/INJ JOINT/BURSA W/O US: CPT | Performed by: NURSE PRACTITIONER

## 2019-04-09 PROCEDURE — 99213 OFFICE O/P EST LOW 20 MIN: CPT | Performed by: NURSE PRACTITIONER

## 2019-04-09 PROCEDURE — 73562 X-RAY EXAM OF KNEE 3: CPT | Performed by: NURSE PRACTITIONER

## 2019-04-09 RX ORDER — METHYLPREDNISOLONE ACETATE 80 MG/ML
80 INJECTION, SUSPENSION INTRA-ARTICULAR; INTRALESIONAL; INTRAMUSCULAR; SOFT TISSUE
Status: COMPLETED | OUTPATIENT
Start: 2019-04-09 | End: 2019-04-09

## 2019-04-09 RX ADMIN — METHYLPREDNISOLONE ACETATE 80 MG: 80 INJECTION, SUSPENSION INTRA-ARTICULAR; INTRALESIONAL; INTRAMUSCULAR; SOFT TISSUE at 17:13

## 2019-04-09 NOTE — PROGRESS NOTES
Patient: Komal Sheffield  YOB: 1945 73 y.o. female  Medical Record Number: [id number]    Chief Complaints:   Chief Complaint   Patient presents with   • Left Knee - Follow-up, Pain       History of Present Illness:Komal Sheffield is a 73 y.o. female who presents with complaints of increased left knee pain.  She had her right knee replaced and that is doing great but started with increased left knee pain about a month ago.  She describes as a moderate constant ache worse with movement better with rest    Allergies:   Allergies   Allergen Reactions   • Ciprofloxacin Itching   • Neomycin Itching   • Bactrim [Sulfamethoxazole-Trimethoprim] Itching   • Lipitor [Atorvastatin] Itching   • Nsaids Unknown (See Comments)     High cr   • Shellfish-Derived Products Other (See Comments)     LOBSTER PER ALLERGIST       Medications:   Current Outpatient Medications   Medication Sig Dispense Refill   • acetaminophen (TYLENOL) 500 MG tablet Take 500 mg by mouth Every 6 (Six) Hours As Needed for Mild Pain .     • cetirizine (zyrTEC) 10 MG tablet Take 10 mg by mouth Daily.     • fluticasone (FLONASE) 50 MCG/ACT nasal spray 2 sprays into the nostril(s) as directed by provider Daily.     • HYDROcodone-acetaminophen (NORCO) 7.5-325 MG per tablet TAKE 1-2 TABLET BY MOUTH EVERY 4-6 HOURS AS NEEDED FOR PAIN 50 tablet 0   • letrozole (FEMARA) 2.5 MG tablet Take 2.5 mg by mouth Daily.     • metFORMIN (GLUCOPHAGE) 1000 MG tablet Take 1,000 mg by mouth 2 (Two) Times a Day With Meals.     • ondansetron (ZOFRAN) 4 MG tablet Take 1 tablet by mouth Every 6 (Six) Hours As Needed for Nausea or Vomiting for up to 10 doses. 10 tablet 0   • rosuvastatin (CRESTOR) 10 MG tablet Take 10 mg by mouth Daily.     • sertraline (ZOLOFT) 50 MG tablet Take 50 mg by mouth Daily.     • valsartan-hydrochlorothiazide (DIOVAN-HCT) 80-12.5 MG per tablet Take 1 tablet by mouth Daily.       No current facility-administered medications for this visit.   "    Facility-Administered Medications Ordered in Other Visits   Medication Dose Route Frequency Provider Last Rate Last Dose   • mupirocin (BACTROBAN) 2 % nasal ointment   Nasal BID Valerio Barakat MD             The following portions of the patient's history were reviewed and updated as appropriate: allergies, current medications, past family history, past medical history, past social history, past surgical history and problem list.    Review of Systems:   A 14 point review of systems was performed. All systems negative except pertinent positives/negative listed in HPI above    Physical Exam:   Vitals:    04/09/19 1512   Temp: 97.4 °F (36.3 °C)   TempSrc: Temporal   Weight: 88.4 kg (194 lb 12.8 oz)   Height: 160 cm (63\")       General: A and O x 3, ASA, NAD    SCLERA:    Normal    DENTITION:   Normal  Skin clear no unusual lesions noted  Left knee patient has trace amount of effusion noted with 115 degrees flexion neutral and extension with a positive Tiki negative Lockman calf soft nontender    Radiology:  Xrays 3 views of left knee were ordered and reviewed today secondary to increased pain and show bone-on-bone end-stage osteoarthritis.  Compared to views are unchanged    Assessment/Plan:  End-stage osteoarthritis left knee    Patient I discussed treatment options and she would like to proceed with left knee cortisone injection.  Prior to injection risks were discussed including pain, infection, elevated blood sugar.  Patient verbalized understanding would like to proceed with injection.  She was referred to outpatient physical therapy and will return the office in 3 months for repeat injection    Large Joint Arthrocentesis: L knee  Date/Time: 4/9/2019 5:13 PM  Consent given by: patient  Site marked: site marked  Timeout: Immediately prior to procedure a time out was called to verify the correct patient, procedure, equipment, support staff and site/side marked as required   Supporting " Documentation  Indications: pain and joint swelling   Procedure Details  Location: knee - L knee  Preparation: Patient was prepped and draped in the usual sterile fashion  Needle gauge: 21 G.  Approach: anterolateral  Medications administered: 2 mL lidocaine (cardiac) 20 MG/ML; 80 mg methylPREDNISolone acetate 80 MG/ML  Patient tolerance: patient tolerated the procedure well with no immediate complications

## 2019-06-25 ENCOUNTER — CLINICAL SUPPORT (OUTPATIENT)
Dept: ORTHOPEDIC SURGERY | Facility: CLINIC | Age: 74
End: 2019-06-25

## 2019-06-25 VITALS — TEMPERATURE: 97.9 F | BODY MASS INDEX: 32.6 KG/M2 | WEIGHT: 184 LBS | HEIGHT: 63 IN

## 2019-06-25 DIAGNOSIS — M25.562 ACUTE PAIN OF LEFT KNEE: Primary | ICD-10-CM

## 2019-06-25 PROCEDURE — 20610 DRAIN/INJ JOINT/BURSA W/O US: CPT | Performed by: NURSE PRACTITIONER

## 2019-06-25 RX ORDER — METHYLPREDNISOLONE ACETATE 80 MG/ML
80 INJECTION, SUSPENSION INTRA-ARTICULAR; INTRALESIONAL; INTRAMUSCULAR; SOFT TISSUE
Status: COMPLETED | OUTPATIENT
Start: 2019-06-25 | End: 2019-06-25

## 2019-06-25 RX ORDER — LIDOCAINE HYDROCHLORIDE 10 MG/ML
2 INJECTION, SOLUTION EPIDURAL; INFILTRATION; INTRACAUDAL; PERINEURAL
Status: COMPLETED | OUTPATIENT
Start: 2019-06-25 | End: 2019-06-25

## 2019-06-25 RX ADMIN — LIDOCAINE HYDROCHLORIDE 2 ML: 10 INJECTION, SOLUTION EPIDURAL; INFILTRATION; INTRACAUDAL; PERINEURAL at 13:35

## 2019-06-25 RX ADMIN — METHYLPREDNISOLONE ACETATE 80 MG: 80 INJECTION, SUSPENSION INTRA-ARTICULAR; INTRALESIONAL; INTRAMUSCULAR; SOFT TISSUE at 13:35

## 2019-06-25 NOTE — PROGRESS NOTES
6/25/2019    Komal Sheffield is here today for worsening knee pain. Pt has undergone injection of the knee in the past with good resolution of symptoms. Pt is requesting a repeat injection.     KNEE Injection Procedure Note:    Large Joint Arthrocentesis: L knee  Date/Time: 6/25/2019 1:35 PM  Consent given by: patient  Site marked: site marked  Timeout: Immediately prior to procedure a time out was called to verify the correct patient, procedure, equipment, support staff and site/side marked as required   Supporting Documentation  Indications: pain and joint swelling   Procedure Details  Location: knee - L knee  Preparation: Patient was prepped and draped in the usual sterile fashion  Needle gauge: 21 G.  Approach: anterolateral  Medications administered: 2 mL lidocaine PF 1% 1 %; 80 mg methylPREDNISolone acetate 80 MG/ML  Patient tolerance: patient tolerated the procedure well with no immediate complications      Prior to injection risks were discussed including pain, infection, elevated blood sugar.  Patient verbalized understanding would like to proceed with injection    At the conclusion of the injection I discussed the importance of continued quad strengthening exercises on a daily basis. I will see the patient back if the symptoms should fail to improve or worsen.    Katina CELESTIN

## 2019-11-05 ENCOUNTER — CLINICAL SUPPORT (OUTPATIENT)
Dept: ORTHOPEDIC SURGERY | Facility: CLINIC | Age: 74
End: 2019-11-05

## 2019-11-05 VITALS — BODY MASS INDEX: 33.8 KG/M2 | TEMPERATURE: 97.2 F | HEIGHT: 64 IN | WEIGHT: 198 LBS

## 2019-11-05 DIAGNOSIS — M17.12 PRIMARY OSTEOARTHRITIS OF LEFT KNEE: Primary | ICD-10-CM

## 2019-11-05 PROCEDURE — 20610 DRAIN/INJ JOINT/BURSA W/O US: CPT | Performed by: NURSE PRACTITIONER

## 2019-11-05 RX ORDER — ONDANSETRON 4 MG/1
4 TABLET, ORALLY DISINTEGRATING ORAL
COMMUNITY
Start: 2019-04-19

## 2019-11-05 RX ORDER — METHYLPREDNISOLONE ACETATE 80 MG/ML
80 INJECTION, SUSPENSION INTRA-ARTICULAR; INTRALESIONAL; INTRAMUSCULAR; SOFT TISSUE
Status: COMPLETED | OUTPATIENT
Start: 2019-11-05 | End: 2019-11-05

## 2019-11-05 RX ORDER — POTASSIUM CITRATE 15 MEQ/1
1 TABLET, EXTENDED RELEASE ORAL DAILY
COMMUNITY

## 2019-11-05 RX ORDER — FLUTICASONE PROPIONATE 0.05 MG/G
1 OINTMENT TOPICAL AS NEEDED
COMMUNITY
Start: 2019-10-23

## 2019-11-05 RX ORDER — CLOBETASOL PROPIONATE 0.5 MG/G
1 CREAM TOPICAL AS NEEDED
COMMUNITY
Start: 2019-07-01

## 2019-11-05 RX ADMIN — METHYLPREDNISOLONE ACETATE 80 MG: 80 INJECTION, SUSPENSION INTRA-ARTICULAR; INTRALESIONAL; INTRAMUSCULAR; SOFT TISSUE at 07:55

## 2019-11-05 NOTE — PROGRESS NOTES
11/5/2019    Komal Sheffield is here today for worsening knee pain. Pt has undergone injection of the knee in the past with good resolution of symptoms. Pt is requesting a repeat injection.     KNEE Injection Procedure Note:    Large Joint Arthrocentesis: L knee  Date/Time: 11/5/2019 7:55 AM  Consent given by: patient  Site marked: site marked  Timeout: Immediately prior to procedure a time out was called to verify the correct patient, procedure, equipment, support staff and site/side marked as required   Supporting Documentation  Indications: pain and joint swelling   Procedure Details  Location: knee - L knee  Preparation: Patient was prepped and draped in the usual sterile fashion  Needle gauge: 21.  Approach: anterolateral  Medications administered: 4 mL lidocaine (cardiac); 80 mg methylPREDNISolone acetate 80 MG/ML  Patient tolerance: patient tolerated the procedure well with no immediate complications          At the conclusion of the injection I discussed the importance of continued quad strengthening exercises on a daily basis. I will see the patient back if the symptoms should fail to improve or worsen.    Katina Dale, APRN    11/5/2019

## 2019-11-19 ENCOUNTER — OFFICE VISIT (OUTPATIENT)
Dept: ORTHOPEDIC SURGERY | Facility: CLINIC | Age: 74
End: 2019-11-19

## 2019-11-19 VITALS — TEMPERATURE: 98.7 F | BODY MASS INDEX: 34.91 KG/M2 | WEIGHT: 197 LBS | HEIGHT: 63 IN

## 2019-11-19 DIAGNOSIS — Z96.651 STATUS POST TOTAL RIGHT KNEE REPLACEMENT: ICD-10-CM

## 2019-11-19 DIAGNOSIS — M17.12 PRIMARY OSTEOARTHRITIS OF LEFT KNEE: Primary | ICD-10-CM

## 2019-11-19 PROCEDURE — 99024 POSTOP FOLLOW-UP VISIT: CPT | Performed by: ORTHOPAEDIC SURGERY

## 2019-11-19 PROCEDURE — 73562 X-RAY EXAM OF KNEE 3: CPT | Performed by: ORTHOPAEDIC SURGERY

## 2019-11-19 NOTE — PROGRESS NOTES
Komal Sheffield : 1945 MRN: [id number] DATE: 2019    DIAGNOSIS: 8 week follow up right total knee      SUBJECTIVE:Patient returns today for 8 week follow up of right total knee replacement. Patient reports doing well with no unusual complaints. Appears to be progressing appropriately.    OBJECTIVE:   Exam:. The incision is well healed. No sign of infection. Range of motion is measured at 0 to 120. The calf is soft and nontender with a negative Homans sign. Strength is progressing and the patient is ambulating appropriately.    DIAGNOSTIC STUDIES  Xrays: 3 views of the right knee (AP, lateral, and sunrise) were ordered and reviewed for evaluation of recent knee replacement. They demonstrate a well positioned, well aligned knee replacement without complicating factors noted. In comparison with previous films there has been no change.    ASSESSMENT: 8 week status post right knee replacement.  Left knee osteoarthritis stable    PLAN: 1) Continue with PT exercises as prescribed   2) Follow up in 10 months   Started to continue to work on quad strengthening exercises for left knee.  She understands that she will likely need knee replacement in the future.    Valerio Barakat MD  2019

## 2020-02-11 ENCOUNTER — CLINICAL SUPPORT (OUTPATIENT)
Dept: ORTHOPEDIC SURGERY | Facility: CLINIC | Age: 75
End: 2020-02-11

## 2020-02-11 VITALS — WEIGHT: 210 LBS | BODY MASS INDEX: 37.21 KG/M2 | TEMPERATURE: 97.5 F | HEIGHT: 63 IN

## 2020-02-11 DIAGNOSIS — M17.12 PRIMARY OSTEOARTHRITIS OF LEFT KNEE: Primary | ICD-10-CM

## 2020-02-11 PROCEDURE — 20610 DRAIN/INJ JOINT/BURSA W/O US: CPT | Performed by: NURSE PRACTITIONER

## 2020-02-11 RX ORDER — METHYLPREDNISOLONE ACETATE 80 MG/ML
80 INJECTION, SUSPENSION INTRA-ARTICULAR; INTRALESIONAL; INTRAMUSCULAR; SOFT TISSUE
Status: COMPLETED | OUTPATIENT
Start: 2020-02-11 | End: 2020-02-11

## 2020-02-11 RX ADMIN — METHYLPREDNISOLONE ACETATE 80 MG: 80 INJECTION, SUSPENSION INTRA-ARTICULAR; INTRALESIONAL; INTRAMUSCULAR; SOFT TISSUE at 14:54

## 2020-02-11 NOTE — PROGRESS NOTES
2/11/2020    Komal Sheffield is here today for worsening knee pain. Pt has undergone injection of the knee in the past with good resolution of symptoms. Pt is requesting a repeat injection.     KNEE Injection Procedure Note:    Large Joint Arthrocentesis: L knee  Date/Time: 2/11/2020 2:54 PM  Consent given by: patient  Site marked: site marked  Timeout: Immediately prior to procedure a time out was called to verify the correct patient, procedure, equipment, support staff and site/side marked as required   Supporting Documentation  Indications: pain and joint swelling   Procedure Details  Location: knee - L knee  Preparation: Patient was prepped and draped in the usual sterile fashion  Needle size: 22 G  Approach: anterolateral  Medications administered: 80 mg methylPREDNISolone acetate 80 MG/ML; 2 mL lidocaine (cardiac)  Patient tolerance: patient tolerated the procedure well with no immediate complications          At the conclusion of the injection I discussed the importance of continued quad strengthening exercises on a daily basis. I will see the patient back if the symptoms should fail to improve or worsen.    Katina Dale, APRN  2/11/2020

## 2020-06-23 ENCOUNTER — CLINICAL SUPPORT (OUTPATIENT)
Dept: ORTHOPEDIC SURGERY | Facility: CLINIC | Age: 75
End: 2020-06-23

## 2020-06-23 VITALS — BODY MASS INDEX: 36.87 KG/M2 | TEMPERATURE: 96.7 F | WEIGHT: 208.1 LBS | HEIGHT: 63 IN

## 2020-06-23 DIAGNOSIS — M17.12 PRIMARY OSTEOARTHRITIS OF LEFT KNEE: Primary | ICD-10-CM

## 2020-06-23 PROCEDURE — 99213 OFFICE O/P EST LOW 20 MIN: CPT | Performed by: NURSE PRACTITIONER

## 2020-06-23 PROCEDURE — 20610 DRAIN/INJ JOINT/BURSA W/O US: CPT | Performed by: NURSE PRACTITIONER

## 2020-06-23 RX ORDER — TRIAMCINOLONE ACETONIDE 40 MG/ML
80 INJECTION, SUSPENSION INTRA-ARTICULAR; INTRAMUSCULAR
Status: COMPLETED | OUTPATIENT
Start: 2020-06-23 | End: 2020-06-23

## 2020-06-23 RX ORDER — PRAVASTATIN SODIUM 10 MG
10 TABLET ORAL NIGHTLY
COMMUNITY
Start: 2020-06-19

## 2020-06-23 RX ORDER — LIDOCAINE HYDROCHLORIDE 20 MG/ML
2 INJECTION, SOLUTION EPIDURAL; INFILTRATION; INTRACAUDAL; PERINEURAL
Status: COMPLETED | OUTPATIENT
Start: 2020-06-23 | End: 2020-06-23

## 2020-06-23 RX ADMIN — TRIAMCINOLONE ACETONIDE 80 MG: 40 INJECTION, SUSPENSION INTRA-ARTICULAR; INTRAMUSCULAR at 13:13

## 2020-06-23 RX ADMIN — LIDOCAINE HYDROCHLORIDE 2 ML: 20 INJECTION, SOLUTION EPIDURAL; INFILTRATION; INTRACAUDAL; PERINEURAL at 13:13

## 2020-06-23 NOTE — PROGRESS NOTES
Patient: Komal Sheffield  YOB: 1945 74 y.o. female  Medical Record Number: [id number]    Chief Complaints:   Chief Complaint   Patient presents with   • Left Knee - Follow-up, Pain       History of Present Illness:Komal Sheffield is a 74 y.o. female who presents with complaints of increased left knee pain.  The patient reports she has had more pain over the last several weeks.  Denies any recent injury.  Describes it as a mild to moderate constant ache worse with standing walking, better with rest    Allergies:   Allergies   Allergen Reactions   • Cholestatin Unknown (See Comments) and Unknown - Low Severity     Pt is unsure of reaction, dr told her to never eat it.  Other reaction(s): Unknown (See Comments)  Pt is unsure of reaction, dr told her to never eat it.   • Atorvastatin Itching and Other (See Comments)     Myalgias, Muscle pain   • Ciprofloxacin Itching   • Neomycin Itching   • Bactrim [Sulfamethoxazole-Trimethoprim] Itching   • Nsaids Unknown (See Comments)     High cr  Other reaction(s): Unknown (See Comments)  High cr  High cr   • Shellfish-Derived Products Other (See Comments)     LOBSTER PER ALLERGIST       Medications:   Current Outpatient Medications   Medication Sig Dispense Refill   • acetaminophen (TYLENOL) 500 MG tablet Take 500 mg by mouth Every 6 (Six) Hours As Needed for Mild Pain .     • cetirizine (zyrTEC) 10 MG tablet Take 10 mg by mouth Daily.     • clobetasol (TEMOVATE) 0.05 % cream Apply  topically to the appropriate area as directed.     • fluticasone (CUTIVATE) 0.005 % ointment Apply  topically to the appropriate area as directed.     • fluticasone (FLONASE) 50 MCG/ACT nasal spray 2 sprays into the nostril(s) as directed by provider Daily.     • letrozole (FEMARA) 2.5 MG tablet Take 2.5 mg by mouth Daily.     • metFORMIN (GLUCOPHAGE) 1000 MG tablet Take 1,000 mg by mouth 2 (Two) Times a Day With Meals.     • ondansetron ODT (ZOFRAN-ODT) 4 MG disintegrating tablet Take 4 mg by  "mouth.     • Potassium Citrate ER 15 MEQ (1620 MG) tablet controlled-release Take  by mouth.     • pravastatin (PRAVACHOL) 10 MG tablet      • sertraline (ZOLOFT) 50 MG tablet Take 50 mg by mouth Daily.     • valsartan-hydrochlorothiazide (DIOVAN-HCT) 80-12.5 MG per tablet Take 1 tablet by mouth Daily.     • rosuvastatin (CRESTOR) 10 MG tablet Take 10 mg by mouth Daily.       No current facility-administered medications for this visit.      Facility-Administered Medications Ordered in Other Visits   Medication Dose Route Frequency Provider Last Rate Last Dose   • mupirocin (BACTROBAN) 2 % nasal ointment   Nasal BID Valerio Barakat MD             The following portions of the patient's history were reviewed and updated as appropriate: allergies, current medications, past family history, past medical history, past social history, past surgical history and problem list.    Review of Systems:   A 14 point review of systems was performed. All systems negative except pertinent positives/negative listed in HPI above    Physical Exam:   Vitals:    06/23/20 1312   Temp: 96.7 °F (35.9 °C)   Weight: 94.4 kg (208 lb 1.6 oz)   Height: 160 cm (63\")   PainSc:   5       General: A and O x 3, ASA, NAD    SCLERA:    Normal    DENTITION:   Normal  Skin clear no unusual lesions noted  Left knee patient has no appreciable effusion limited range of motion secondary to pain calf is soft and nontender    Radiology:  Xrays 3views (ap,lateral, sunrise) previous x-rays of the left knee were reviewed and show significant arthritic changes    Assessment/Plan:  Osteoarthritis left knee    Patient discussed treatment options.  She would like to proceed with left knee cortisone injection.  Prior to injection risks were discussed including pain infection.  Patient verbalized understanding would like to proceed with injection she will continue with physical therapy exercises we will see her back as needed    Large Joint Arthrocentesis: L " knee  Date/Time: 6/23/2020 1:13 PM  Consent given by: patient  Site marked: site marked  Timeout: Immediately prior to procedure a time out was called to verify the correct patient, procedure, equipment, support staff and site/side marked as required   Supporting Documentation  Indications: pain   Procedure Details  Location: knee - L knee  Preparation: Patient was prepped and draped in the usual sterile fashion  Needle gauge: 21g.  Approach: lateral  Medications administered: 2 mL lidocaine PF 2% 2 %; 80 mg triamcinolone acetonide 40 MG/ML  Patient tolerance: patient tolerated the procedure well with no immediate complications

## 2020-09-22 ENCOUNTER — CLINICAL SUPPORT (OUTPATIENT)
Dept: ORTHOPEDIC SURGERY | Facility: CLINIC | Age: 75
End: 2020-09-22

## 2020-09-22 VITALS — HEIGHT: 63 IN | TEMPERATURE: 97.6 F | BODY MASS INDEX: 37.21 KG/M2 | WEIGHT: 210 LBS

## 2020-09-22 DIAGNOSIS — M25.562 ACUTE PAIN OF LEFT KNEE: Primary | ICD-10-CM

## 2020-09-22 PROCEDURE — 99213 OFFICE O/P EST LOW 20 MIN: CPT | Performed by: NURSE PRACTITIONER

## 2020-09-22 PROCEDURE — 20610 DRAIN/INJ JOINT/BURSA W/O US: CPT | Performed by: NURSE PRACTITIONER

## 2020-09-22 RX ORDER — METHYLPREDNISOLONE ACETATE 80 MG/ML
80 INJECTION, SUSPENSION INTRA-ARTICULAR; INTRALESIONAL; INTRAMUSCULAR; SOFT TISSUE
Status: COMPLETED | OUTPATIENT
Start: 2020-09-22 | End: 2020-09-22

## 2020-09-22 RX ADMIN — METHYLPREDNISOLONE ACETATE 80 MG: 80 INJECTION, SUSPENSION INTRA-ARTICULAR; INTRALESIONAL; INTRAMUSCULAR; SOFT TISSUE at 08:17

## 2020-09-22 NOTE — PROGRESS NOTES
Patient: Komal Sheffield  YOB: 1945 74 y.o. female  Medical Record Number: [id number]    Chief Complaints: Left Knee Pain    History of Present Illness:Komal Sheffield is a 74 y.o. female who presents with complaints of increased left knee pain.  Patient denies any injury.  Has had increased pain over the last 3 weeks, she describes the knee pain as a moderate sometimes severe constant ache worse with standing better with rest    Allergies:   Allergies   Allergen Reactions   • Cholestatin Unknown (See Comments) and Unknown - Low Severity     Pt is unsure of reaction, dr told her to never eat it.  Other reaction(s): Unknown (See Comments)  Pt is unsure of reaction, dr told her to never eat it.   • Atorvastatin Itching and Other (See Comments)     Myalgias, Muscle pain   • Ciprofloxacin Itching   • Neomycin Itching   • Bactrim [Sulfamethoxazole-Trimethoprim] Itching   • Nsaids Unknown (See Comments)     High cr  Other reaction(s): Unknown (See Comments)  High cr  High cr   • Shellfish-Derived Products Other (See Comments)     LOBSTER PER ALLERGIST       Medications:   Current Outpatient Medications   Medication Sig Dispense Refill   • acetaminophen (TYLENOL) 500 MG tablet Take 500 mg by mouth Every 6 (Six) Hours As Needed for Mild Pain .     • cetirizine (zyrTEC) 10 MG tablet Take 10 mg by mouth Daily.     • clobetasol (TEMOVATE) 0.05 % cream Apply  topically to the appropriate area as directed.     • fluticasone (CUTIVATE) 0.005 % ointment Apply  topically to the appropriate area as directed.     • fluticasone (FLONASE) 50 MCG/ACT nasal spray 2 sprays into the nostril(s) as directed by provider Daily.     • letrozole (FEMARA) 2.5 MG tablet Take 2.5 mg by mouth Daily.     • metFORMIN (GLUCOPHAGE) 1000 MG tablet Take 1,000 mg by mouth 2 (Two) Times a Day With Meals.     • ondansetron ODT (ZOFRAN-ODT) 4 MG disintegrating tablet Take 4 mg by mouth.     • Potassium Citrate ER 15 MEQ (1620 MG) tablet  "controlled-release Take  by mouth.     • pravastatin (PRAVACHOL) 10 MG tablet      • rosuvastatin (CRESTOR) 10 MG tablet Take 10 mg by mouth Daily.     • sertraline (ZOLOFT) 50 MG tablet Take 50 mg by mouth Daily.     • valsartan-hydrochlorothiazide (DIOVAN-HCT) 80-12.5 MG per tablet Take 1 tablet by mouth Daily.       No current facility-administered medications for this visit.      Facility-Administered Medications Ordered in Other Visits   Medication Dose Route Frequency Provider Last Rate Last Dose   • mupirocin (BACTROBAN) 2 % nasal ointment   Nasal BID Valerio Barakat MD             The following portions of the patient's history were reviewed and updated as appropriate: allergies, current medications, past family history, past medical history, past social history, past surgical history and problem list.    Review of Systems:   A 14 point review of systems was performed. All systems negative except pertinent positives/negative listed in HPI above    Physical Exam:   Vitals:    09/22/20 1255   Temp: 97.6 °F (36.4 °C)   TempSrc: Temporal   Weight: 95.3 kg (210 lb)   Height: 160 cm (63\")       General: A and O x 3, ASA, NAD    SCLERA:    Normal    DENTITION:   Normal  Skin clear no unusual lesions noted  Left knee patient has decreased range of motion secondary to pain with a positive Stinchfield positive logroll calf soft and nontender    Radiology:  Xrays previous x-rays of the left knee were reviewed show significant arthritic changes.    Assessment/Plan:  Osteoarthritis left knee    Patient discussed treatment options, she would like to proceed with left knee cortisone injection.  Prior to injection risks were discussed.  Patient verbalized understanding would like to proceed with injection she will continue with physical therapy exercises we will see her back as needed    Large Joint Arthrocentesis: L knee  Date/Time: 9/22/2020 8:17 AM  Consent given by: patient  Site marked: site marked  Timeout: Immediately " prior to procedure a time out was called to verify the correct patient, procedure, equipment, support staff and site/side marked as required   Supporting Documentation  Indications: pain   Procedure Details  Location: knee - L knee  Preparation: Patient was prepped and draped in the usual sterile fashion  Needle gauge: 21g.  Approach: lateral  Medications administered: 2 mL lidocaine (cardiac); 80 mg methylPREDNISolone acetate 80 MG/ML  Patient tolerance: patient tolerated the procedure well with no immediate complications

## 2020-11-17 ENCOUNTER — OFFICE VISIT (OUTPATIENT)
Dept: ORTHOPEDIC SURGERY | Facility: CLINIC | Age: 75
End: 2020-11-17

## 2020-11-17 VITALS — TEMPERATURE: 97.6 F | HEIGHT: 63 IN | BODY MASS INDEX: 37.56 KG/M2 | WEIGHT: 212 LBS

## 2020-11-17 DIAGNOSIS — Z96.651 STATUS POST TOTAL RIGHT KNEE REPLACEMENT: Primary | ICD-10-CM

## 2020-11-17 PROCEDURE — 99212 OFFICE O/P EST SF 10 MIN: CPT | Performed by: ORTHOPAEDIC SURGERY

## 2020-11-17 PROCEDURE — 73562 X-RAY EXAM OF KNEE 3: CPT | Performed by: ORTHOPAEDIC SURGERY

## 2020-11-17 RX ORDER — METFORMIN HYDROCHLORIDE 500 MG/1
TABLET, EXTENDED RELEASE ORAL
COMMUNITY
Start: 2020-11-04 | End: 2021-04-08

## 2020-11-17 RX ORDER — ASPIRIN 81 MG/1
81 TABLET ORAL DAILY
COMMUNITY
End: 2021-04-24 | Stop reason: HOSPADM

## 2020-11-17 RX ORDER — FAMOTIDINE 40 MG/1
40 TABLET, FILM COATED ORAL AS NEEDED
COMMUNITY
Start: 2020-11-17 | End: 2021-04-24 | Stop reason: HOSPADM

## 2020-11-17 NOTE — PROGRESS NOTES
"Komal Sheffield : 1945 MRN: [id number] DATE: 2020    Chief Complaint:  Follow up right total knee 2 years out    SUBJECTIVE:Patient returns today for 2-year follow up of right total knee replacement. Patient reports doing well with no unusual complaints. Denies any limitations due to the knee.  Also has known advanced arthritis of the left knee but that is doing well minimally symptomatic is well controlled currently with injections.    OBJECTIVE:    Temp 97.6 °F (36.4 °C) (Temporal)   Ht 160 cm (63\")   Wt 96.2 kg (212 lb)   BMI 37.55 kg/m²   Family History   Problem Relation Age of Onset   • Malig Hyperthermia Neg Hx      Past Medical History:   Diagnosis Date   • Arthritis    • Cancer (CMS/HCC)     Breast CA 2016 with a mastectomy    • Depression    • Diabetes mellitus (CMS/HCC)    • Hyperlipidemia    • Hypertension    • Knee pain, bilateral    • Osteopenia      Past Surgical History:   Procedure Laterality Date   • CHOLECYSTECTOMY     • HYSTERECTOMY     • KIDNEY STONE SURGERY     • LYMPH NODE DISSECTION Right    • MASTECTOMY Right    • TONSILLECTOMY     • TOTAL KNEE ARTHROPLASTY Right 2018    Procedure: RT TOTAL KNEE ARTHROPLASTY;  Surgeon: Valerio Barakat MD;  Location: [facility];  Service: Orthopedics     Social History     Socioeconomic History   • Marital status:      Spouse name: Not on file   • Number of children: Not on file   • Years of education: Not on file   • Highest education level: Not on file   Tobacco Use   • Smoking status: Never Smoker   • Smokeless tobacco: Never Used   Substance and Sexual Activity   • Alcohol use: No   • Drug use: No   • Sexual activity: Defer       Review of Systems: 14 point review of systems performed pertinent positives and negatives discussed above, all other systems are negative    Exam:. The incision is well healed. Range of motion is measured at 0 to 125. The calf is soft and nontender with a negative Homans sign. Alignment is neutral. " Good quad strength. There is no evidence of varus/valgus or flexion instability. No effusion. Intact to light touch with palpable distal pulses.  Knee:  left    ALIGNMENT:     Varus  ,   Patella  tracks  midline    GAIT:    Antalgic    SKIN:    No abnormality    RANGE OF MOTION:   3  -  110   DEG    STRENGTH:   4  / 5    LIGAMENTS:    No varus / valgus instability.   Negative  Lachman.    MENISCUS:     Negative   Tiki       DISTAL PULSES:    Paplable    DISTAL SENSATION :   Intact    LYMPHATICS:     No   lymphadenopathy    OTHER:          - Positive   effusion      - Crepitance with ROM   Knee:  right    ALIGNMENT:     Neutral  ,   Patella  tracks  Midline without crepitance    GAIT:    Nonantalgic    SKIN:    Well healed midline incision, no erythema or fluctuance    RANGE OF MOTION:   0  -  119   DEG    STRENGTH:   5  / 5    LIGAMENTS:    No varus / valgus instability.   No  Flexion   instability.       DISTAL PULSES:    Paplable    DISTAL SENSATION :   Intact    LYMPHATICS:     No   lymphadenopathy    OTHER:     No   Effusion      Calf soft / nontender ,   Negative Vijay's sign              DIAGNOSTIC STUDIES  Xrays: 3 views(AP bilateral knees, lateral right, and sunrise bilateral knees) were ordered and reviewed for evaluation of right knee replacement. They demonstrate a well positioned, well aligned knee replacement without complicating factors noted. In comparison with previous films there has been no change.  Severe advanced end-stage osteoarthritis of the left knee    ASSESSMENT:   Annual follow up right knee replacement. doing well with the exception of left knee OA however that is well controlled with current injection regimen       PLAN:    Continue activities as tolerated  Follow up as needed.  We will end up needing left total knee replacement in the future but for now is well controlled  Valerio Barakat MD  11/17/2020

## 2020-12-22 ENCOUNTER — OFFICE VISIT (OUTPATIENT)
Dept: ORTHOPEDIC SURGERY | Facility: CLINIC | Age: 75
End: 2020-12-22

## 2020-12-22 VITALS — TEMPERATURE: 97.1 F | WEIGHT: 212.4 LBS | BODY MASS INDEX: 37.63 KG/M2 | HEIGHT: 63 IN

## 2020-12-22 DIAGNOSIS — M25.562 ACUTE PAIN OF LEFT KNEE: Primary | ICD-10-CM

## 2020-12-22 PROCEDURE — 99213 OFFICE O/P EST LOW 20 MIN: CPT | Performed by: NURSE PRACTITIONER

## 2020-12-22 PROCEDURE — 20610 DRAIN/INJ JOINT/BURSA W/O US: CPT | Performed by: NURSE PRACTITIONER

## 2020-12-22 RX ORDER — METHYLPREDNISOLONE ACETATE 80 MG/ML
80 INJECTION, SUSPENSION INTRA-ARTICULAR; INTRALESIONAL; INTRAMUSCULAR; SOFT TISSUE
Status: COMPLETED | OUTPATIENT
Start: 2020-12-22 | End: 2020-12-22

## 2020-12-22 RX ADMIN — METHYLPREDNISOLONE ACETATE 80 MG: 80 INJECTION, SUSPENSION INTRA-ARTICULAR; INTRALESIONAL; INTRAMUSCULAR; SOFT TISSUE at 08:03

## 2021-02-26 ENCOUNTER — TELEPHONE (OUTPATIENT)
Dept: ORTHOPEDIC SURGERY | Facility: CLINIC | Age: 76
End: 2021-02-26

## 2021-02-26 NOTE — TELEPHONE ENCOUNTER
Schedule patient to see me either Tuesday or Thursday of next week, I can have Dr. Barakat come in and see her as well to discuss surgery

## 2021-02-26 NOTE — TELEPHONE ENCOUNTER
----- Message from Komal Sheffield sent at 2/26/2021 11:28 AM EST -----  Regarding: Non-Urgent Medical Question  Contact: [phone]  Chu Ambriz!  The last injection did not help very much, and now I am in a lot of pain in my knee joint.  It's becoming difficult to bend my knee to walk.  I'm thinking I'm getting close to another knee replacement.      Should I make an appointment with Dr. Barakat?    Rhina

## 2021-02-26 NOTE — TELEPHONE ENCOUNTER
Tried calling pt to schedule appt for either next tues or next Thursday for MLL. LVM to call our office back so we can get this scheduled.

## 2021-03-04 ENCOUNTER — TELEPHONE (OUTPATIENT)
Dept: ORTHOPEDIC SURGERY | Facility: CLINIC | Age: 76
End: 2021-03-04

## 2021-03-04 NOTE — TELEPHONE ENCOUNTER
Have patient schedule appointment with me either next Tuesday or Thursday and I will have Dr. Barakat come in as well to discuss options with patient

## 2021-03-04 NOTE — TELEPHONE ENCOUNTER
Provider: KERON WRIGHT    Caller: DIANA AHMADI  Relationship to Patient: SELF    Phone Number: [phone]    Reason for Call: PATIENT REQUESTING TO SEE DR SCOTT FOR POSSIBLE SURGERY ON LEFT KNEE

## 2021-03-09 ENCOUNTER — OFFICE VISIT (OUTPATIENT)
Dept: ORTHOPEDIC SURGERY | Facility: CLINIC | Age: 76
End: 2021-03-09

## 2021-03-09 VITALS — TEMPERATURE: 97.3 F | WEIGHT: 212 LBS | HEIGHT: 63 IN | BODY MASS INDEX: 37.56 KG/M2

## 2021-03-09 DIAGNOSIS — M17.12 PRIMARY OSTEOARTHRITIS OF LEFT KNEE: Primary | ICD-10-CM

## 2021-03-09 DIAGNOSIS — R52 PAIN: Primary | ICD-10-CM

## 2021-03-09 DIAGNOSIS — M17.12 PRIMARY OSTEOARTHRITIS OF LEFT KNEE: ICD-10-CM

## 2021-03-09 PROCEDURE — 73562 X-RAY EXAM OF KNEE 3: CPT | Performed by: NURSE PRACTITIONER

## 2021-03-09 PROCEDURE — 99214 OFFICE O/P EST MOD 30 MIN: CPT | Performed by: NURSE PRACTITIONER

## 2021-03-09 RX ORDER — PREGABALIN 75 MG/1
150 CAPSULE ORAL ONCE
Status: CANCELLED | OUTPATIENT
Start: 2021-04-23 | End: 2021-03-09

## 2021-03-09 RX ORDER — CEFAZOLIN SODIUM 2 G/100ML
2 INJECTION, SOLUTION INTRAVENOUS ONCE
Status: CANCELLED | OUTPATIENT
Start: 2021-04-23 | End: 2021-03-09

## 2021-03-09 NOTE — PROGRESS NOTES
Patient: Komal Sheffield  YOB: 1945 75 y.o. female  Medical Record Number: [id number]    Chief Complaints:   Chief Complaint   Patient presents with   • Left Knee - Follow-up, Pain       History of Present Illness:Komal Sheffield is a 75 y.o. female who presents with severe left knee pain.  She has tried and failed all conservative measures including injections, physical therapy, anti-inflammatories, she would like to proceed with surgery.    Allergies:   Allergies   Allergen Reactions   • Cholestatin Unknown (See Comments) and Unknown - Low Severity     Pt is unsure of reaction, dr told her to never eat it.  Other reaction(s): Unknown (See Comments)  Pt is unsure of reaction, dr told her to never eat it.   • Atorvastatin Itching and Other (See Comments)     Myalgias, Muscle pain   • Ciprofloxacin Itching   • Neomycin Itching   • Bactrim [Sulfamethoxazole-Trimethoprim] Itching   • Nsaids Unknown (See Comments)     High cr  Other reaction(s): Unknown (See Comments)  High cr  High cr   • Shellfish-Derived Products Other (See Comments)     LOBSTER PER ALLERGIST       Medications:   Current Outpatient Medications   Medication Sig Dispense Refill   • acetaminophen (TYLENOL) 500 MG tablet Take 500 mg by mouth Every 6 (Six) Hours As Needed for Mild Pain .     • aspirin (aspirin) 81 MG EC tablet Take 81 mg by mouth Daily.     • cetirizine (zyrTEC) 10 MG tablet Take 10 mg by mouth Daily.     • clobetasol (TEMOVATE) 0.05 % cream Apply  topically to the appropriate area as directed.     • famotidine (PEPCID) 40 MG tablet Take 40 mg by mouth.     • fluticasone (CUTIVATE) 0.005 % ointment Apply  topically to the appropriate area as directed.     • fluticasone (FLONASE) 50 MCG/ACT nasal spray 2 sprays into the nostril(s) as directed by provider Daily.     • letrozole (FEMARA) 2.5 MG tablet Take 2.5 mg by mouth Daily.     • metFORMIN ER (GLUCOPHAGE-XR) 500 MG 24 hr tablet TAKE FOUR TABLETS BY MOUTH DAILY WITH BREAKFAST    "  • ondansetron ODT (ZOFRAN-ODT) 4 MG disintegrating tablet Take 4 mg by mouth.     • Potassium Citrate ER 15 MEQ (1620 MG) tablet controlled-release Take  by mouth.     • pravastatin (PRAVACHOL) 10 MG tablet      • rosuvastatin (CRESTOR) 10 MG tablet Take 10 mg by mouth Daily.     • sertraline (ZOLOFT) 50 MG tablet Take 50 mg by mouth Daily.     • valsartan-hydrochlorothiazide (DIOVAN-HCT) 80-12.5 MG per tablet Take 1 tablet by mouth Daily.     • metFORMIN (GLUCOPHAGE) 1000 MG tablet Take 1,000 mg by mouth 2 (Two) Times a Day With Meals.       No current facility-administered medications for this visit.     Facility-Administered Medications Ordered in Other Visits   Medication Dose Route Frequency Provider Last Rate Last Admin   • mupirocin (BACTROBAN) 2 % nasal ointment   Nasal BID Valerio Barakat MD             The following portions of the patient's history were reviewed and updated as appropriate: allergies, current medications, past family history, past medical history, past social history, past surgical history and problem list.    Review of Systems:   A 14 point review of systems was performed. All systems negative except pertinent positives/negative listed in HPI above    Physical Exam:   Vitals:    03/09/21 1421   Temp: 97.3 °F (36.3 °C)   Weight: 96.2 kg (212 lb)   Height: 160 cm (63\")   PainSc:   7       General: A and O x 3, ASA, NAD    SCLERA:    Normal    DENTITION:   Normal  Skin clear no unusual lesions noted  Left knee patient has no appreciable effusion 110 degrees flexion neutral extension with a positive Tiki negative Lockman calf soft and nontender       Radiology:  Xrays 3views (ap,lateral, sunrise) left knee were ordered and reviewed today secondary to pain and show bone-on-bone end-stage osteoarthritis with cyst and spur formation.  Compared to views show definite progression in arthritic changes    Assessment/Plan: End-stage osteoarthritis left knee    Patient I discussed options, she " would like to proceed with left total knee replacement  Continuation of conservative management vs. TKA discussed.  The patient wishes to proceed with total knee replacement.  At this point the patient has failed the full compliment of conservative treatment and stating complete understanding of the risks/benefits/ anternatives wishes to proceed with surgical treatment.    Risk and benefits of surgery were reviewed.  Including, but not limited to, blood clots or pulmonary embolism, anesthesia risk, infection, fracture, skin/leg numbness, persistent pain/crepitance/popping/catching, failure of the implant, need for future surgeries, hematoma, possible nerve or blood vessel injury, need for transfusion, and potential risk of stroke,heart attack or death, among others.  The patient understands and wishes to proceed.     It was explained that if tissue has been repaired or reconstructed, there is also an increased chance of failure which may require further management.  Following the completion of the discussion, the patient expressed understanding of this planned course of care, all their questions were answered and consent will be obtained preoperatively.    Operative Plan: [facility] Oxinium Total Knee Replacement an overnight staywith home health rehab        Katina Dale, APRN  3/9/2021

## 2021-03-10 PROBLEM — M17.12 PRIMARY OSTEOARTHRITIS OF LEFT KNEE: Status: ACTIVE | Noted: 2021-03-10

## 2021-03-18 ENCOUNTER — TREATMENT (OUTPATIENT)
Dept: PHYSICAL THERAPY | Facility: CLINIC | Age: 76
End: 2021-03-18

## 2021-03-18 DIAGNOSIS — M25.562 CHRONIC PAIN OF LEFT KNEE: Primary | ICD-10-CM

## 2021-03-18 DIAGNOSIS — R29.898 DIFFICULTY IN WEIGHT BEARING: ICD-10-CM

## 2021-03-18 DIAGNOSIS — M17.12 PRIMARY OSTEOARTHRITIS OF LEFT KNEE: ICD-10-CM

## 2021-03-18 DIAGNOSIS — G89.29 CHRONIC PAIN OF LEFT KNEE: Primary | ICD-10-CM

## 2021-03-18 PROCEDURE — 97110 THERAPEUTIC EXERCISES: CPT | Performed by: PHYSICAL THERAPIST

## 2021-03-18 PROCEDURE — 97530 THERAPEUTIC ACTIVITIES: CPT | Performed by: PHYSICAL THERAPIST

## 2021-03-18 PROCEDURE — 97161 PT EVAL LOW COMPLEX 20 MIN: CPT | Performed by: PHYSICAL THERAPIST

## 2021-03-18 NOTE — PROGRESS NOTES
Physical Therapy Initial Evaluation and Plan of Care    Patient: Komal Sheffield   : 1945  Diagnosis/ICD-10 Code:  Chronic pain of left knee [M25.562, G89.29]  Referring practitioner: SABI Adam  Date of Initial Visit: 3/18/2021  Today's Date: 3/18/2021  Patient seen for 1 sessions           Subjective Questionnaire: LEFS: 46/80      Subjective Evaluation    History of Present Illness  Mechanism of injury: Pt presents for physical therapy evaluation on this date with a 5 year history of left knee pain. Pt was a previous Pt at this facility from 2018-2019 for her Rt knee which she was being prehabbed for TKA for her Rt knee, which she later had and received PT for after surgery. Pt to have TKA on her Lt knee on 2021 and she wishes to gain strength/ROM in her knee before having surgery. She states that her Lt knee has been bothering her for a long time but has gotten worse in the last 3 months.        Patient Occupation: / Quality of life: good    Pain  Current pain ratin  At worst pain ratin  Location: Lateral calf, in the joint  Quality: sharp  Relieving factors: rest (Tylenol)  Aggravating factors: ambulation, standing and squatting    Diagnostic Tests  X-ray: abnormal    Treatments  Previous treatment: physical therapy and injection treatment  Patient Goals  Patient goals for therapy: increased strength and increased motion  Patient goal: To get as strong as possible/as much ROM as possible before TKA           Objective          Tenderness   Left Knee   Tenderness in the inferior patella (mod), lateral joint line (min), medial joint line (min), patellar tendon (min) and pes anserinus (min).     Active Range of Motion   Left Knee   Flexion: 119 degrees with pain  Extension: 7 degrees     Strength/Myotome Testing     Left Knee   Flexion: 3+  Extension: 5  Quadriceps contraction: fair    Right Knee   Flexion: 5  Extension: 5    Swelling      Left Knee Girth Measurement (cm)   Joint line: 46 cm  10 cm above joint line: 52 (51.5) cm  10 cm below joint line: 39.5.          Assessment & Plan     Assessment  Impairments: abnormal gait, abnormal or restricted ROM, activity intolerance, impaired physical strength, lacks appropriate home exercise program, pain with function, safety issue and weight-bearing intolerance  Assessment details: Pt presents for physical therapy evaluation on this date with signs and symptoms consistent with Lt knee OA as evidenced by restricted Lt knee AROM/PROM, decreased knee strength, difficulty in weightbearing/loading her knee in flexion, and impaired gait. Pt to have Lt TKA on 4/23/2021. Pt educated on proper performance of HEP, importance of proper quad contraction/strength, hip strength, increasing knee AROM/PROM pre TKA to improve her post surgical outcome as much as possible. Pt tolerated all exercises well. Pt educated on use of [employer] arnoldo on her smart phone to allow use of videos showing proper form while performing her HEP. Plan is to discharge this patient on this date to be independent with her HEP. Skilled PT services not indicated at this time.  Prognosis: good  Functional Limitations: lifting, walking, uncomfortable because of pain and standing  Goals  Plan Goals: STG - 1 visit    1. Pt is independent with HEP for pre-rehabilitation in preparation for her left TKA.    Plan  Therapy options: will not be seen for skilled physical therapy services  Duration in visits: 1  Treatment plan discussed with: patient  Plan details: See assessment        Manual Therapy:         mins  50473;  Therapeutic Exercise:     23    mins  17894;     Neuromuscular Fina:        mins  23102;    Therapeutic Activity:     15     mins  69637;     Gait Training:           mins  32182;     Ultrasound:          mins  51395;    Electrical Stimulation:         mins  04954 ( );  Dry Needling          mins self-pay    Timed Treatment:    38   mins   Total Treatment:     60   mins    PT SIGNATURE: Alondra Burris, PT; [facility] Student PT  DATE TREATMENT INITIATED: 3/18/2021    Initial Certification  Certification Period: 6/16/2021  I certify that the therapy services are furnished while this patient is under my care.  The services outlined above are required by this patient, and will be reviewed every 90 days.     PHYSICIAN: Katina Dale, APRN      DATE:     Please sign and return via fax to [phone].. Thank you, [facility].

## 2021-03-25 ENCOUNTER — DOCUMENTATION (OUTPATIENT)
Dept: PHYSICAL THERAPY | Facility: CLINIC | Age: 76
End: 2021-03-25

## 2021-03-25 DIAGNOSIS — M17.11 PRIMARY OSTEOARTHRITIS OF RIGHT KNEE: Primary | ICD-10-CM

## 2021-03-25 NOTE — PROGRESS NOTES
Discharge Summary  Discharge Summary from Physical Therapy Report    Patient Information  Komal Sheffield  1945    Dates  PT visit: 9/28/2018-1/7/2019  Number of Visits: 17     Discharge Status of Patient: See MD Note dated 1/7/2019 for objective measures.      Goals: Partially Met   Original goals set 9/28/2018 all met for prior to surgery-see note on 10/31/2018 for objective measures and addressing original goals.      Goals  Plan Goals: STGs: 2-3 weeks  1. Decrease right knee pain 4/10 at it's worst  MET   2. Increase right knee AROM 0-110 degrees MET   3.  Decrease right knee medial knee tenderness to minimal to none with palpation.  MET   4.  Increase right hamstring flexibility to WNL with SLR test   MET   5.  Pt ambulates into clinic with an single point cane minimal antalgic gait right LE.  MET     LTGs: 4-6 weeks  1.  Pt Independent with HEP.  MET   2.  Increase right knee AROM 0-120 degrees  MET   3.  Increase right knee and hip strength to 5/5  NOT MET   4.  Pt ambulates right LE without an antalgic gait.  MET   5.  Decrease right knee pain to a 1-2/10 at it's worst  MET       Visit Diagnoses:    ICD-10-CM ICD-9-CM   1. Primary osteoarthritis of right knee  M17.11 715.16       Discharge Plan: Continue with current home exercise program as instructed    Comments MD discharged pt from PT.      Date of Discharge 1/9/2019 by MD, but discharge note written on 3/25/2021.          Alondra Burris, PT  Physical Therapist

## 2021-04-05 ENCOUNTER — TRANSCRIBE ORDERS (OUTPATIENT)
Dept: PREADMISSION TESTING | Facility: HOSPITAL | Age: 76
End: 2021-04-05

## 2021-04-05 DIAGNOSIS — Z01.818 OTHER SPECIFIED PRE-OPERATIVE EXAMINATION: Primary | ICD-10-CM

## 2021-04-08 ENCOUNTER — PRE-ADMISSION TESTING (OUTPATIENT)
Dept: PREADMISSION TESTING | Facility: HOSPITAL | Age: 76
End: 2021-04-08

## 2021-04-08 VITALS
OXYGEN SATURATION: 96 % | DIASTOLIC BLOOD PRESSURE: 77 MMHG | HEIGHT: 63 IN | SYSTOLIC BLOOD PRESSURE: 159 MMHG | WEIGHT: 211 LBS | HEART RATE: 110 BPM | TEMPERATURE: 98.8 F | BODY MASS INDEX: 37.39 KG/M2 | RESPIRATION RATE: 20 BRPM

## 2021-04-08 DIAGNOSIS — M17.12 PRIMARY OSTEOARTHRITIS OF LEFT KNEE: ICD-10-CM

## 2021-04-08 LAB
ANION GAP SERPL CALCULATED.3IONS-SCNC: 11.8 MMOL/L (ref 5–15)
BACTERIA UR QL AUTO: NORMAL /HPF
BILIRUB UR QL STRIP: NEGATIVE
BUN SERPL-MCNC: 18 MG/DL (ref 8–23)
BUN/CREAT SERPL: 16.8 (ref 7–25)
CALCIUM SPEC-SCNC: 9.9 MG/DL (ref 8.6–10.5)
CHLORIDE SERPL-SCNC: 100 MMOL/L (ref 98–107)
CLARITY UR: CLEAR
CO2 SERPL-SCNC: 27.2 MMOL/L (ref 22–29)
COD CRY URNS QL: NORMAL /HPF
COLOR UR: ABNORMAL
CREAT SERPL-MCNC: 1.07 MG/DL (ref 0.57–1)
DEPRECATED RDW RBC AUTO: 41.5 FL (ref 37–54)
ERYTHROCYTE [DISTWIDTH] IN BLOOD BY AUTOMATED COUNT: 13.1 % (ref 12.3–15.4)
GFR SERPL CREATININE-BSD FRML MDRD: 50 ML/MIN/1.73
GLUCOSE SERPL-MCNC: 103 MG/DL (ref 65–99)
GLUCOSE UR STRIP-MCNC: NEGATIVE MG/DL
HCT VFR BLD AUTO: 39.5 % (ref 34–46.6)
HGB BLD-MCNC: 13.1 G/DL (ref 12–15.9)
HGB UR QL STRIP.AUTO: NEGATIVE
HYALINE CASTS UR QL AUTO: NORMAL /LPF
KETONES UR QL STRIP: ABNORMAL
LEUKOCYTE ESTERASE UR QL STRIP.AUTO: ABNORMAL
MCH RBC QN AUTO: 28.9 PG (ref 26.6–33)
MCHC RBC AUTO-ENTMCNC: 33.2 G/DL (ref 31.5–35.7)
MCV RBC AUTO: 87.2 FL (ref 79–97)
NITRITE UR QL STRIP: NEGATIVE
PH UR STRIP.AUTO: 6 [PH] (ref 5–8)
PLATELET # BLD AUTO: 425 10*3/MM3 (ref 140–450)
PMV BLD AUTO: 9.2 FL (ref 6–12)
POTASSIUM SERPL-SCNC: 4.3 MMOL/L (ref 3.5–5.2)
PROT UR QL STRIP: ABNORMAL
QT INTERVAL: 366 MS
RBC # BLD AUTO: 4.53 10*6/MM3 (ref 3.77–5.28)
RBC # UR: NORMAL /HPF
REF LAB TEST METHOD: NORMAL
SODIUM SERPL-SCNC: 139 MMOL/L (ref 136–145)
SP GR UR STRIP: >=1.03 (ref 1–1.03)
SQUAMOUS #/AREA URNS HPF: NORMAL /HPF
UROBILINOGEN UR QL STRIP: ABNORMAL
WBC # BLD AUTO: 8.7 10*3/MM3 (ref 3.4–10.8)
WBC UR QL AUTO: NORMAL /HPF

## 2021-04-08 PROCEDURE — 36415 COLL VENOUS BLD VENIPUNCTURE: CPT

## 2021-04-08 PROCEDURE — 81001 URINALYSIS AUTO W/SCOPE: CPT

## 2021-04-08 PROCEDURE — 85027 COMPLETE CBC AUTOMATED: CPT

## 2021-04-08 PROCEDURE — 93010 ELECTROCARDIOGRAM REPORT: CPT | Performed by: INTERNAL MEDICINE

## 2021-04-08 PROCEDURE — 93005 ELECTROCARDIOGRAM TRACING: CPT

## 2021-04-08 PROCEDURE — 80048 BASIC METABOLIC PNL TOTAL CA: CPT

## 2021-04-08 RX ORDER — CHLORHEXIDINE GLUCONATE 500 MG/1
1 CLOTH TOPICAL
COMMUNITY
End: 2021-04-24 | Stop reason: HOSPADM

## 2021-04-08 RX ORDER — MULTIPLE VITAMINS W/ MINERALS TAB 9MG-400MCG
1 TAB ORAL DAILY
COMMUNITY
End: 2021-04-24 | Stop reason: HOSPADM

## 2021-04-08 ASSESSMENT — KOOS JR
KOOS JR SCORE: 65.994
KOOS JR SCORE: 8

## 2021-04-08 NOTE — DISCHARGE INSTRUCTIONS
Take the following medications the morning of surgery:    flonase nasal spray     If you are on prescription narcotic pain medication to control your pain you may also take that medication the morning of surgery.    General Instructions:  • Do not eat solid food after midnight the night before surgery.  • You may drink clear liquids day of surgery but must stop at least one hour before your hospital arrival time.  • It is beneficial for you to have a clear drink that contains carbohydrates the day of surgery.  We suggest a 12 to 20 ounce bottle of Gatorade or Powerade for non-diabetic patients or a 12 to 20 ounce bottle of G2 or Powerade Zero for diabetic patients. (Pediatric patients, are not advised to drink a 12 to 20 ounce carbohydrate drink)    Clear liquids are liquids you can see through.  Nothing red in color.     Plain water                               Sports drinks  Sodas                                   Gelatin (Jell-O)  Fruit juices without pulp such as white grape juice and apple juice  Popsicles that contain no fruit or yogurt  Tea or coffee (no cream or milk added)  Gatorade / Powerade  G2 / Powerade Zero    • Infants may have breast milk up to four hours before surgery.  • Infants drinking formula may drink formula up to six hours before surgery.   • Patients who avoid smoking, chewing tobacco and alcohol for 4 weeks prior to surgery have a reduced risk of post-operative complications.  Quit smoking as many days before surgery as you can.  • Do not smoke, use chewing tobacco or drink alcohol the day of surgery.   • If applicable bring your C-PAP/ BI-PAP machine.  • Bring any papers given to you in the doctor’s office.  • Wear clean comfortable clothes.  • Do not wear contact lenses, false eyelashes or make-up.  Bring a case for your glasses.   • Bring crutches or walker if applicable.  • Remove all piercings.  Leave jewelry and any other valuables at home.  • Hair extensions with metal clips must  be removed prior to surgery.  • The Pre-Admission Testing nurse will instruct you to bring medications if unable to obtain an accurate list in Pre-Admission Testing.        If you were given a blood bank ID arm band remember to bring it with you the day of surgery.    Preventing a Surgical Site Infection:  • For 2 to 3 days before surgery, avoid shaving with a razor because the razor can irritate skin and make it easier to develop an infection.    • Any areas of open skin can increase the risk of a post-operative wound infection by allowing bacteria to enter and travel throughout the body.  Notify your surgeon if you have any skin wounds / rashes even if it is not near the expected surgical site.  The area will need assessed to determine if surgery should be delayed until it is healed.  • The night prior to surgery shower using a fresh bar of anti-bacterial soap (such as Dial) and clean washcloth.  Sleep in a clean bed with clean clothing.  Do not allow pets to sleep with you.  • Shower on the morning of surgery using a fresh bar of anti-bacterial soap (such as Dial) and clean washcloth.  Dry with a clean towel and dress in clean clothing.  • Ask your surgeon if you will be receiving antibiotics prior to surgery.  • Make sure you, your family, and all healthcare providers clean their hands with soap and water or an alcohol based hand  before caring for you or your wound.    Day of surgery:4/23/2021   MD office to inform pt of arrival time for surgery  arrival time is approximately two hours before your scheduled surgery time.  Upon arrival, a Pre-op nurse and Anesthesiologist will review your health history, obtain vital signs, and answer questions you may have.  The only belongings needed at this time will be a list of your home medications and if applicable your C-PAP/BI-PAP machine.  A Pre-op nurse will start an IV and you may receive medication in preparation for surgery, including something to help you  relax.     Please be aware that surgery does come with discomfort.  We want to make every effort to control your discomfort so please discuss any uncontrolled symptoms with your nurse.   Your doctor will most likely have prescribed pain medications.      If you are going home after surgery you will receive individualized written care instructions before being discharged.  A responsible adult must drive you to and from the hospital on the day of your surgery and stay with you for 24 hours.  Discharge prescriptions can be filled by the hospital pharmacy during regular pharmacy hours.  If you are having surgery late in the day/evening your prescription may be e-prescribed to your pharmacy.  Please verify your pharmacy hours or chose a 24 hour pharmacy to avoid not having access to your prescription because your pharmacy has closed for the day.    If you are staying overnight following surgery, you will be transported to your hospital room following the recovery period.  [facility] has all private rooms.    If you have any questions please call Pre-Admission Testing at [phone].  Deductibles and co-payments are collected on the day of service. Please be prepared to pay the required co-pay, deductible or deposit on the day of service as defined by your plan.    Patient Education for Self-Quarantine Process    Following your COVID testing, we strongly recommend that you do not leave your home after you have been tested for COVID except to get medical care. This includes not going to work, school or to public areas.  If this is not possible for you to do please limit your activities to only required outings.  Be sure to wear a mask when you are with other people, practice social distancing and wash your hands frequently.      The following items provide additional details to keep you safe.  • Wash your hands with soap and water frequently for at least 20 seconds.   • Avoid touching your eyes, nose and  mouth with unwashed hands.  • Do not share anything - utensils, towels, food from the same bowl.   • Have your own utensils, drinking glass, dishes, towels and bedding.   • Do not have visitors.   • Do use FaceTime to stay in touch with family and friends.  • You should stay in a specific room away from others if possible.   • Stay at least 6 feet away from others in the home if you cannot have a dedicated room to yourself.   • Do not snuggle with your pet. While the CDC says there is no evidence that pets can spread COVID-19 or be infected from humans, it is probably best to avoid “petting, snuggling, being kissed or licked and sharing food (during self-quarantine)”, according to the CDC.   • Sanitize household surfaces daily. Include all high touch areas (door handles, light switches, phones, countertops, etc.)  • Do not share a bathroom with others, if possible.   • Wear a mask around others in your home if you are unable to stay in a separate room or 6 feet apart. If  you are unable to wear a mask, have your family member wear a mask if they must be within 6 feet of you.   Call your surgeon immediately if you experience any of the following symptoms:  • Sore Throat  • Shortness of Breath or difficulty breathing  • Cough  • Chills  • Body soreness or muscle pain  • Headache  • Fever  • New loss of taste or smell  • Do not arrive for your surgery ill.  Your procedure will need to be rescheduled to another time.  You will need to call your physician before the day of surgery to avoid any unnecessary exposure to hospital staff as well as other patients.    CHLORHEXIDINE CLOTH INSTRUCTIONS  The morning of surgery follow these instructions using the Chlorhexidine cloths you've been given.  These steps reduce bacteria on the body.  Do not use the cloths near your eyes, ears mouth, genitalia or on open wounds.  Throw the cloths away after use but do not try to flush them down a toilet.      • Open and remove one cloth  at a time from the package.    • Leave the cloth unfolded and begin the bathing.  • Massage the skin with the cloths using gentle pressure to remove bacteria.  Do not scrub harshly.   • Follow the steps below with one 2% CHG cloth per area (6 total cloths).  • One cloth for neck, shoulders and chest.  • One cloth for both arms, hands, fingers and underarms (do underarms last).  • One cloth for the abdomen followed by groin.  • One cloth for right leg and foot including between the toes.  • One cloth for left leg and foot including between the toes.  • The last cloth is to be used for the back of the neck, back and buttocks.    Allow the CHG to air dry 3 minutes on the skin which will give it time to work and decrease the chance of irritation.  The skin may feel sticky until it is dry.  Do not rinse with water or any other liquid or you will lose the beneficial effects of the CHG.  If mild skin irritation occurs, do rinse the skin to remove the CHG.  Report this to the nurse at time of admission.  Do not apply lotions, creams, ointments, deodorants or perfumes after using the clothes. Dress in clean clothes before coming to the hospital.    BACTROBAN NASAL OINTMENT  There are many germs normally in your nose. Bactroban is an ointment that will help reduce these germs. Please follow these instructions for Bactroban use:      __1__The day before surgery in the morning  Date_4/22/2021_______    _2_The day before surgery in the evening              Date_4/22/2021_______    _3___The day of surgery in the morning    Date_4/21/2021_______    **Squirt ½ package of Bactroban Ointment onto a cotton applicator and apply to inside of 1st nostril.  Squirt the remaining Bactroban and apply to the inside of the other nostril.    PERIDEX- ORAL:  Use only if your surgeon has ordered  Use the night before and morning of surgery - Swish, gargle, and spit - do not swallow.

## 2021-04-15 ENCOUNTER — OFFICE VISIT (OUTPATIENT)
Dept: ORTHOPEDIC SURGERY | Facility: CLINIC | Age: 76
End: 2021-04-15

## 2021-04-15 VITALS
BODY MASS INDEX: 36.32 KG/M2 | SYSTOLIC BLOOD PRESSURE: 151 MMHG | HEIGHT: 63 IN | TEMPERATURE: 98.2 F | DIASTOLIC BLOOD PRESSURE: 99 MMHG | WEIGHT: 205 LBS

## 2021-04-15 DIAGNOSIS — R52 PAIN: Primary | ICD-10-CM

## 2021-04-15 PROCEDURE — 77077 JOINT SURVEY SINGLE VIEW: CPT | Performed by: ORTHOPAEDIC SURGERY

## 2021-04-15 PROCEDURE — S0260 H&P FOR SURGERY: HCPCS | Performed by: NURSE PRACTITIONER

## 2021-04-15 NOTE — H&P
Patient: Komal Sheffield    Date of Admission: 4/23/2021    YOB: 1945    Medical Record Number: [id number]    Admitting Physician: Dr. Valerio Barakat    Reason for Admission: End Stage Left Knee OA    History of Present Illness: 75 y.o. female presents with severe end stage knee osteoarthritis which has not been responsive to the full compliment of conservative measures. Despite conservative attempts, there is still severe, constant activity limiting pain. Given the severity of the pain, the patient has elected to proceed with knee replacement.    Allergies:   Allergies   Allergen Reactions   • Cholestatin Unknown (See Comments) and Unknown - Low Severity     Pt is unsure of reaction, dr told her to never eat it.  Other reaction(s): Unknown (See Comments)  Pt is unsure of reaction,  told her to never eat it.   • Atorvastatin Itching and Other (See Comments)     Myalgias, Muscle pain   • Ciprofloxacin Itching   • Neomycin Itching   • Bactrim [Sulfamethoxazole-Trimethoprim] Itching   • Nsaids Unknown (See Comments)     High cr  Other reaction(s): Unknown (See Comments)     • Shellfish-Derived Products Other (See Comments)     LOBSTER PER ALLERGIST         Current Medications:  Home Medications:    Current Outpatient Medications on File Prior to Visit   Medication Sig   • acetaminophen (TYLENOL) 500 MG tablet Take 500 mg by mouth Every 6 (Six) Hours As Needed for Mild Pain .   • aspirin (aspirin) 81 MG EC tablet Take 81 mg by mouth Daily. To stop a week before surgery   • cetirizine (zyrTEC) 10 MG tablet Take 10 mg by mouth Every Night.   • Chlorhexidine Gluconate Cloth 2 % pads Apply 1 application topically. USE AS DIRECTED PREOP   • clobetasol (TEMOVATE) 0.05 % cream Apply  topically to the appropriate area as directed.   • famotidine (PEPCID) 40 MG tablet Take 40 mg by mouth As Needed.   • fluticasone (CUTIVATE) 0.005 % ointment Apply  topically to the appropriate area as directed.   • fluticasone (FLONASE)  50 MCG/ACT nasal spray 2 sprays into the nostril(s) as directed by provider Every Morning.   • letrozole (FEMARA) 2.5 MG tablet Take 2.5 mg by mouth Daily.   • metFORMIN (GLUCOPHAGE) 1000 MG tablet Take 1,000 mg by mouth Daily With Breakfast.   • multivitamin with minerals (Multivitamin Adults) tablet tablet Take 1 tablet by mouth Daily.   • mupirocin (BACTROBAN) 2 % nasal ointment 1 application into the nostril(s) as directed by provider 2 (Two) Times a Day.   • ondansetron ODT (ZOFRAN-ODT) 4 MG disintegrating tablet Take 4 mg by mouth.   • Potassium Citrate ER 15 MEQ (1620 MG) tablet controlled-release Take  by mouth.   • pravastatin (PRAVACHOL) 10 MG tablet Take 10 mg by mouth Every Night.   • sertraline (ZOLOFT) 50 MG tablet Take 50 mg by mouth Daily.   • valsartan-hydrochlorothiazide (DIOVAN-HCT) 80-12.5 MG per tablet Take 1 tablet by mouth Daily.   • VITAMIN D PO Take 1 capsule by mouth Daily.     Current Facility-Administered Medications on File Prior to Visit   Medication   • Chlorhexidine Gluconate 2 % pads 2 each   • mupirocin (BACTROBAN) 2 % nasal ointment     PRN Meds:.    PMH:     Past Medical History:   Diagnosis Date   • Arthritis    • Cancer (CMS/HCC)     Breast CA 12/2016 with a mastectomy    • Depression    • Diabetes mellitus (CMS/HCC)    • History of kidney stones    • Hyperlipidemia    • Hypertension    • Knee pain, bilateral    • Osteopenia        PF/Surg/Soc Hx:     Past Surgical History:   Procedure Laterality Date   • BREAST SURGERY Right 2016     mastectomy with lymph node disection as well as reconstruction latet   • CHOLECYSTECTOMY     • COLONOSCOPY     • CYSTOSCOPY BLADDER BIOPSY     • HYSTERECTOMY     • KIDNEY STONE SURGERY      multiple    • LYMPH NODE DISSECTION Right     breast   • MASTECTOMY Right    • TONSILLECTOMY     • TOTAL KNEE ARTHROPLASTY Right 11/12/2018    Procedure: RT TOTAL KNEE ARTHROPLASTY;  Surgeon: Valerio Barakat MD;  Location: [facility];  Service: Orthopedics  "       Social History     Occupational History   • Not on file   Tobacco Use   • Smoking status: Never Smoker   • Smokeless tobacco: Never Used   Vaping Use   • Vaping Use: Never used   Substance and Sexual Activity   • Alcohol use: Never   • Drug use: Never   • Sexual activity: Defer      Social History     Social History Narrative   • Not on file        Family History   Problem Relation Age of Onset   • Malig Hyperthermia Neg Hx          Review of Systems:   A 14 point review of systems was performed, pertinent positives discussed above, all other systems are negative    Physical Exam: 75 y.o. female  Vital Signs :   Vitals:    04/15/21 1548   BP: 151/99   BP Location: Left arm   Patient Position: Sitting   Cuff Size: Adult   Temp: 98.2 °F (36.8 °C)   TempSrc: Temporal   Weight: 93 kg (205 lb)   Height: 160 cm (63\")     General: Alert and Oriented x 3, No acute distress.  Psych: mood and affect appropriate; recent and remote memory intact  Eyes: conjunctiva clear; pupils equally round and reactive, sclera nonicteric  CV: no peripheral edema  Resp: normal respiratory effort  Skin: no rashes or wounds; normal turgor  Musculosketetal; pain and crepitance with knee range of motion  Vascular: palpable distal pulses    Xrays:  -3 views (AP, lateral, and sunrise) were reviewed demonstrating end-stage OA with bone on bone articulation.  -A full length AP xray was ordered and reviewed today for purposes of operative alignment demonstrating end stage arthritic findings. There are no previous full length films for review    Assessment:  End-stage Left knee osteoarthritis. Conservative measures have failed.      Plan:  The plan is to proceed with Left Total Knee Replacement. The patient voiced understanding of the risks, benefits, and alternative forms of treatment that were discussed with Dr Barakat at the time of scheduling. 23     Katina Dale, APRN  4/15/2021        "

## 2021-04-21 ENCOUNTER — LAB (OUTPATIENT)
Dept: LAB | Facility: HOSPITAL | Age: 76
End: 2021-04-21

## 2021-04-21 DIAGNOSIS — Z01.818 OTHER SPECIFIED PRE-OPERATIVE EXAMINATION: ICD-10-CM

## 2021-04-21 PROCEDURE — U0004 COV-19 TEST NON-CDC HGH THRU: HCPCS

## 2021-04-21 PROCEDURE — C9803 HOPD COVID-19 SPEC COLLECT: HCPCS

## 2021-04-21 PROCEDURE — U0005 INFEC AGEN DETEC AMPLI PROBE: HCPCS

## 2021-04-22 LAB — SARS-COV-2 RNA RESP QL NAA+PROBE: NOT DETECTED

## 2021-04-23 ENCOUNTER — ANESTHESIA EVENT (OUTPATIENT)
Dept: PERIOP | Facility: HOSPITAL | Age: 76
End: 2021-04-23

## 2021-04-23 ENCOUNTER — ANESTHESIA (OUTPATIENT)
Dept: PERIOP | Facility: HOSPITAL | Age: 76
End: 2021-04-23

## 2021-04-23 ENCOUNTER — APPOINTMENT (OUTPATIENT)
Dept: GENERAL RADIOLOGY | Facility: HOSPITAL | Age: 76
End: 2021-04-23

## 2021-04-23 ENCOUNTER — HOSPITAL ENCOUNTER (OUTPATIENT)
Facility: HOSPITAL | Age: 76
Discharge: HOME HEALTH | End: 2021-04-24
Attending: ORTHOPAEDIC SURGERY | Admitting: ORTHOPAEDIC SURGERY

## 2021-04-23 DIAGNOSIS — Z96.652 S/P TKR (TOTAL KNEE REPLACEMENT), LEFT: Primary | ICD-10-CM

## 2021-04-23 DIAGNOSIS — M17.12 PRIMARY OSTEOARTHRITIS OF LEFT KNEE: ICD-10-CM

## 2021-04-23 LAB
GLUCOSE BLDC GLUCOMTR-MCNC: 131 MG/DL (ref 70–130)
GLUCOSE BLDC GLUCOMTR-MCNC: 147 MG/DL (ref 70–130)
GLUCOSE BLDC GLUCOMTR-MCNC: 218 MG/DL (ref 70–130)
GLUCOSE BLDC GLUCOMTR-MCNC: 278 MG/DL (ref 70–130)

## 2021-04-23 PROCEDURE — 63710000001 HYDROCHLOROTHIAZIDE 12.5 MG CAPSULE 100 EACH BOTTLE: Performed by: NURSE PRACTITIONER

## 2021-04-23 PROCEDURE — C1889 IMPLANT/INSERT DEVICE, NOC: HCPCS | Performed by: ORTHOPAEDIC SURGERY

## 2021-04-23 PROCEDURE — 25010000002 FENTANYL CITRATE (PF) 100 MCG/2ML SOLUTION: Performed by: NURSE ANESTHETIST, CERTIFIED REGISTERED

## 2021-04-23 PROCEDURE — 97161 PT EVAL LOW COMPLEX 20 MIN: CPT

## 2021-04-23 PROCEDURE — 27447 TOTAL KNEE ARTHROPLASTY: CPT | Performed by: ORTHOPAEDIC SURGERY

## 2021-04-23 PROCEDURE — 63710000001 MUPIROCIN 2 % OINTMENT: Performed by: NURSE PRACTITIONER

## 2021-04-23 PROCEDURE — 27447 TOTAL KNEE ARTHROPLASTY: CPT | Performed by: NURSE PRACTITIONER

## 2021-04-23 PROCEDURE — 97110 THERAPEUTIC EXERCISES: CPT

## 2021-04-23 PROCEDURE — A9270 NON-COVERED ITEM OR SERVICE: HCPCS | Performed by: NURSE PRACTITIONER

## 2021-04-23 PROCEDURE — C9290 INJ, BUPIVACAINE LIPOSOME: HCPCS | Performed by: ORTHOPAEDIC SURGERY

## 2021-04-23 PROCEDURE — 63710000001 MELATONIN 1 MG TABLET: Performed by: NURSE PRACTITIONER

## 2021-04-23 PROCEDURE — 25010000003 CEFAZOLIN IN DEXTROSE 2-4 GM/100ML-% SOLUTION: Performed by: NURSE PRACTITIONER

## 2021-04-23 PROCEDURE — 63710000001 LETROZOLE 2.5 MG TABLET: Performed by: NURSE PRACTITIONER

## 2021-04-23 PROCEDURE — 73560 X-RAY EXAM OF KNEE 1 OR 2: CPT

## 2021-04-23 PROCEDURE — C1713 ANCHOR/SCREW BN/BN,TIS/BN: HCPCS | Performed by: ORTHOPAEDIC SURGERY

## 2021-04-23 PROCEDURE — 63710000001 SERTRALINE 50 MG TABLET: Performed by: NURSE PRACTITIONER

## 2021-04-23 PROCEDURE — 25010000002 DEXAMETHASONE PER 1 MG: Performed by: NURSE ANESTHETIST, CERTIFIED REGISTERED

## 2021-04-23 PROCEDURE — 82962 GLUCOSE BLOOD TEST: CPT

## 2021-04-23 PROCEDURE — 63710000001 VALSARTAN 80 MG TABLET 90 EACH BOTTLE: Performed by: NURSE PRACTITIONER

## 2021-04-23 PROCEDURE — 63710000001 ONDANSETRON PER 8 MG: Performed by: NURSE PRACTITIONER

## 2021-04-23 PROCEDURE — G0378 HOSPITAL OBSERVATION PER HR: HCPCS

## 2021-04-23 PROCEDURE — 25010000002 PHENYLEPHRINE PER 1 ML: Performed by: NURSE ANESTHETIST, CERTIFIED REGISTERED

## 2021-04-23 PROCEDURE — 63710000001 POTASSIUM CHLORIDE 10 MEQ TABLET CONTROLLED-RELEASE: Performed by: NURSE PRACTITIONER

## 2021-04-23 PROCEDURE — 25010000003 BUPIVACAINE LIPOSOME 1.3 % SUSPENSION 20 ML VIAL: Performed by: ORTHOPAEDIC SURGERY

## 2021-04-23 PROCEDURE — C1776 JOINT DEVICE (IMPLANTABLE): HCPCS | Performed by: ORTHOPAEDIC SURGERY

## 2021-04-23 PROCEDURE — 25010000002 VANCOMYCIN 10 G RECONSTITUTED SOLUTION: Performed by: NURSE PRACTITIONER

## 2021-04-23 PROCEDURE — 25010000002 PROPOFOL 10 MG/ML EMULSION: Performed by: NURSE ANESTHETIST, CERTIFIED REGISTERED

## 2021-04-23 PROCEDURE — 63710000001 HYDROCODONE-ACETAMINOPHEN 7.5-325 MG TABLET: Performed by: NURSE PRACTITIONER

## 2021-04-23 DEVICE — LEGION NARROW CRUCIATE RETAINING                                    OXINIUM SIZE 5N LEFT
Type: IMPLANTABLE DEVICE | Site: KNEE | Status: FUNCTIONAL
Brand: LEGION

## 2021-04-23 DEVICE — LEGION CRUCIATE RETAINING HIGH                                    FLEX HIGHLY CROSS LINKED                                    POLYETHYLENE SIZE 3-4 9MM
Type: IMPLANTABLE DEVICE | Site: KNEE | Status: FUNCTIONAL
Brand: LEGION

## 2021-04-23 DEVICE — IMPLANTABLE DEVICE: Type: IMPLANTABLE DEVICE | Site: KNEE | Status: FUNCTIONAL

## 2021-04-23 DEVICE — PALACOS® R IS A FAST-CURING, RADIOPAQUE, POLY(METHYL METHACRYLATE)-BASED BONE CEMENT.PALACOS ® R CONTAINS THE X-RAY CONTRAST MEDIUM ZIRCONIUM DIOXIDE. TO IMPROVE VISIBILITY IN THE SURGICAL FIELD PALACOS ® R HAS BEEN COLOURED WITH CHLOROPHYLL (E141). THE BONE CEMENT IS PREPARED DIRECTLY BEFORE USE BY MIXING A POLYMER POWDER COMPONENT WITH A LIQUID MONOMER COMPONENT. A DUCTILE DOUGH FORMS WHICH CURES WITHIN A FEW MINUTES.
Type: IMPLANTABLE DEVICE | Site: KNEE | Status: FUNCTIONAL
Brand: PALACOS®

## 2021-04-23 DEVICE — GENESIS II NON-POROUS TIBIAL                                    BASEPLATE SIZE 3 LEFT
Type: IMPLANTABLE DEVICE | Site: KNEE | Status: FUNCTIONAL
Brand: GENESIS II

## 2021-04-23 DEVICE — VIOLET ANTIBACTERIAL POLYDIOXANONE, KNOTLESS TISSUE CONTROL DEVICE
Type: IMPLANTABLE DEVICE | Site: KNEE | Status: FUNCTIONAL
Brand: STRATAFIX

## 2021-04-23 DEVICE — GEN II 7.5MM RESUR PAT 32MM
Type: IMPLANTABLE DEVICE | Site: KNEE | Status: FUNCTIONAL
Brand: GENESIS II

## 2021-04-23 DEVICE — KNOTLESS TISSUE CONTROL DEVICE, UNDYED UNIDIRECTIONAL (ANTIBACTERIAL) SYNTHETIC ABSORBABLE DEVICE
Type: IMPLANTABLE DEVICE | Site: KNEE | Status: FUNCTIONAL
Brand: STRATAFIX

## 2021-04-23 RX ORDER — ASPIRIN 81 MG/1
81 TABLET ORAL 2 TIMES DAILY
Qty: 60 TABLET | Refills: 0 | Status: SHIPPED | OUTPATIENT
Start: 2021-04-24 | End: 2021-05-24

## 2021-04-23 RX ORDER — PANTOPRAZOLE SODIUM 40 MG/1
40 TABLET, DELAYED RELEASE ORAL
Status: DISCONTINUED | OUTPATIENT
Start: 2021-04-24 | End: 2021-04-24 | Stop reason: HOSPADM

## 2021-04-23 RX ORDER — FENTANYL CITRATE 50 UG/ML
50 INJECTION, SOLUTION INTRAMUSCULAR; INTRAVENOUS
Status: DISCONTINUED | OUTPATIENT
Start: 2021-04-23 | End: 2021-04-23 | Stop reason: HOSPADM

## 2021-04-23 RX ORDER — DIPHENHYDRAMINE HCL 25 MG
25 CAPSULE ORAL
Status: DISCONTINUED | OUTPATIENT
Start: 2021-04-23 | End: 2021-04-23 | Stop reason: HOSPADM

## 2021-04-23 RX ORDER — TRANEXAMIC ACID 100 MG/ML
INJECTION, SOLUTION INTRAVENOUS AS NEEDED
Status: DISCONTINUED | OUTPATIENT
Start: 2021-04-23 | End: 2021-04-23 | Stop reason: SURG

## 2021-04-23 RX ORDER — SODIUM CHLORIDE 0.9 % (FLUSH) 0.9 %
3-10 SYRINGE (ML) INJECTION AS NEEDED
Status: DISCONTINUED | OUTPATIENT
Start: 2021-04-23 | End: 2021-04-23 | Stop reason: HOSPADM

## 2021-04-23 RX ORDER — PROMETHAZINE HYDROCHLORIDE 25 MG/1
25 TABLET ORAL ONCE AS NEEDED
Status: DISCONTINUED | OUTPATIENT
Start: 2021-04-23 | End: 2021-04-23 | Stop reason: HOSPADM

## 2021-04-23 RX ORDER — HYDROCODONE BITARTRATE AND ACETAMINOPHEN 7.5; 325 MG/1; MG/1
2 TABLET ORAL EVERY 4 HOURS PRN
Status: DISCONTINUED | OUTPATIENT
Start: 2021-04-23 | End: 2021-04-24 | Stop reason: HOSPADM

## 2021-04-23 RX ORDER — LIDOCAINE HYDROCHLORIDE 10 MG/ML
0.5 INJECTION, SOLUTION EPIDURAL; INFILTRATION; INTRACAUDAL; PERINEURAL ONCE AS NEEDED
Status: DISCONTINUED | OUTPATIENT
Start: 2021-04-23 | End: 2021-04-23 | Stop reason: HOSPADM

## 2021-04-23 RX ORDER — ACETAMINOPHEN 325 MG/1
325 TABLET ORAL EVERY 4 HOURS PRN
Status: DISCONTINUED | OUTPATIENT
Start: 2021-04-23 | End: 2021-04-24 | Stop reason: HOSPADM

## 2021-04-23 RX ORDER — POLYETHYLENE GLYCOL 3350 17 G/17G
17 POWDER, FOR SOLUTION ORAL 2 TIMES DAILY
Qty: 255 G | Refills: 0 | Status: SHIPPED | OUTPATIENT
Start: 2021-04-23 | End: 2021-04-30

## 2021-04-23 RX ORDER — SODIUM CHLORIDE 0.9 % (FLUSH) 0.9 %
3 SYRINGE (ML) INJECTION EVERY 12 HOURS SCHEDULED
Status: DISCONTINUED | OUTPATIENT
Start: 2021-04-23 | End: 2021-04-23 | Stop reason: HOSPADM

## 2021-04-23 RX ORDER — LABETALOL HYDROCHLORIDE 5 MG/ML
5 INJECTION, SOLUTION INTRAVENOUS
Status: DISCONTINUED | OUTPATIENT
Start: 2021-04-23 | End: 2021-04-23 | Stop reason: HOSPADM

## 2021-04-23 RX ORDER — POTASSIUM CHLORIDE 750 MG/1
20 TABLET, FILM COATED, EXTENDED RELEASE ORAL DAILY
Status: DISCONTINUED | OUTPATIENT
Start: 2021-04-23 | End: 2021-04-24 | Stop reason: HOSPADM

## 2021-04-23 RX ORDER — PROPOFOL 10 MG/ML
VIAL (ML) INTRAVENOUS CONTINUOUS PRN
Status: DISCONTINUED | OUTPATIENT
Start: 2021-04-23 | End: 2021-04-23 | Stop reason: SURG

## 2021-04-23 RX ORDER — CEFAZOLIN SODIUM 2 G/100ML
2 INJECTION, SOLUTION INTRAVENOUS ONCE
Status: COMPLETED | OUTPATIENT
Start: 2021-04-23 | End: 2021-04-23

## 2021-04-23 RX ORDER — NALOXONE HCL 0.4 MG/ML
0.2 VIAL (ML) INJECTION AS NEEDED
Status: DISCONTINUED | OUTPATIENT
Start: 2021-04-23 | End: 2021-04-23 | Stop reason: HOSPADM

## 2021-04-23 RX ORDER — FLUMAZENIL 0.1 MG/ML
0.2 INJECTION INTRAVENOUS AS NEEDED
Status: DISCONTINUED | OUTPATIENT
Start: 2021-04-23 | End: 2021-04-23 | Stop reason: HOSPADM

## 2021-04-23 RX ORDER — PROMETHAZINE HYDROCHLORIDE 25 MG/1
25 SUPPOSITORY RECTAL ONCE AS NEEDED
Status: DISCONTINUED | OUTPATIENT
Start: 2021-04-23 | End: 2021-04-23 | Stop reason: HOSPADM

## 2021-04-23 RX ORDER — DEXAMETHASONE SODIUM PHOSPHATE 4 MG/ML
INJECTION, SOLUTION INTRA-ARTICULAR; INTRALESIONAL; INTRAMUSCULAR; INTRAVENOUS; SOFT TISSUE AS NEEDED
Status: DISCONTINUED | OUTPATIENT
Start: 2021-04-23 | End: 2021-04-23 | Stop reason: SURG

## 2021-04-23 RX ORDER — ONDANSETRON 2 MG/ML
4 INJECTION INTRAMUSCULAR; INTRAVENOUS ONCE AS NEEDED
Status: DISCONTINUED | OUTPATIENT
Start: 2021-04-23 | End: 2021-04-23 | Stop reason: HOSPADM

## 2021-04-23 RX ORDER — UREA 10 %
3 LOTION (ML) TOPICAL NIGHTLY PRN
Status: DISCONTINUED | OUTPATIENT
Start: 2021-04-23 | End: 2021-04-24 | Stop reason: HOSPADM

## 2021-04-23 RX ORDER — HYDROCODONE BITARTRATE AND ACETAMINOPHEN 7.5; 325 MG/1; MG/1
TABLET ORAL
Qty: 60 TABLET | Refills: 0 | Status: SHIPPED | OUTPATIENT
Start: 2021-04-23 | End: 2021-04-29 | Stop reason: SDUPTHER

## 2021-04-23 RX ORDER — OXYCODONE AND ACETAMINOPHEN 7.5; 325 MG/1; MG/1
1 TABLET ORAL ONCE AS NEEDED
Status: DISCONTINUED | OUTPATIENT
Start: 2021-04-23 | End: 2021-04-23 | Stop reason: HOSPADM

## 2021-04-23 RX ORDER — HYDROMORPHONE HYDROCHLORIDE 1 MG/ML
0.5 INJECTION, SOLUTION INTRAMUSCULAR; INTRAVENOUS; SUBCUTANEOUS
Status: DISCONTINUED | OUTPATIENT
Start: 2021-04-23 | End: 2021-04-23 | Stop reason: HOSPADM

## 2021-04-23 RX ORDER — LETROZOLE 2.5 MG/1
2.5 TABLET, FILM COATED ORAL NIGHTLY
Status: DISCONTINUED | OUTPATIENT
Start: 2021-04-23 | End: 2021-04-24 | Stop reason: HOSPADM

## 2021-04-23 RX ORDER — DIPHENHYDRAMINE HYDROCHLORIDE 50 MG/ML
12.5 INJECTION INTRAMUSCULAR; INTRAVENOUS
Status: DISCONTINUED | OUTPATIENT
Start: 2021-04-23 | End: 2021-04-23 | Stop reason: HOSPADM

## 2021-04-23 RX ORDER — MAGNESIUM HYDROXIDE 1200 MG/15ML
LIQUID ORAL AS NEEDED
Status: DISCONTINUED | OUTPATIENT
Start: 2021-04-23 | End: 2021-04-23 | Stop reason: HOSPADM

## 2021-04-23 RX ORDER — BUPIVACAINE HYDROCHLORIDE 7.5 MG/ML
INJECTION, SOLUTION EPIDURAL; RETROBULBAR
Status: COMPLETED | OUTPATIENT
Start: 2021-04-23 | End: 2021-04-23

## 2021-04-23 RX ORDER — PANTOPRAZOLE SODIUM 40 MG/1
40 TABLET, DELAYED RELEASE ORAL DAILY
Qty: 14 TABLET | Refills: 0 | Status: SHIPPED | OUTPATIENT
Start: 2021-04-23 | End: 2021-05-07

## 2021-04-23 RX ORDER — HYDROCODONE BITARTRATE AND ACETAMINOPHEN 7.5; 325 MG/1; MG/1
1 TABLET ORAL EVERY 4 HOURS PRN
Status: DISCONTINUED | OUTPATIENT
Start: 2021-04-23 | End: 2021-04-24 | Stop reason: HOSPADM

## 2021-04-23 RX ORDER — ASPIRIN 81 MG/1
81 TABLET ORAL EVERY 12 HOURS SCHEDULED
Status: DISCONTINUED | OUTPATIENT
Start: 2021-04-24 | End: 2021-04-24 | Stop reason: HOSPADM

## 2021-04-23 RX ORDER — POLYETHYLENE GLYCOL 3350 17 G/17G
17 POWDER, FOR SOLUTION ORAL 2 TIMES DAILY
Status: DISCONTINUED | OUTPATIENT
Start: 2021-04-23 | End: 2021-04-24 | Stop reason: HOSPADM

## 2021-04-23 RX ORDER — EPHEDRINE SULFATE 50 MG/ML
5 INJECTION, SOLUTION INTRAVENOUS ONCE AS NEEDED
Status: DISCONTINUED | OUTPATIENT
Start: 2021-04-23 | End: 2021-04-23 | Stop reason: HOSPADM

## 2021-04-23 RX ORDER — PROMETHAZINE HYDROCHLORIDE 25 MG/1
12.5 TABLET ORAL EVERY 4 HOURS PRN
Status: CANCELLED | OUTPATIENT
Start: 2021-04-23

## 2021-04-23 RX ORDER — ONDANSETRON 2 MG/ML
4 INJECTION INTRAMUSCULAR; INTRAVENOUS EVERY 6 HOURS PRN
Status: DISCONTINUED | OUTPATIENT
Start: 2021-04-23 | End: 2021-04-24 | Stop reason: HOSPADM

## 2021-04-23 RX ORDER — ALBUTEROL SULFATE 2.5 MG/3ML
2.5 SOLUTION RESPIRATORY (INHALATION) ONCE AS NEEDED
Status: DISCONTINUED | OUTPATIENT
Start: 2021-04-23 | End: 2021-04-23 | Stop reason: HOSPADM

## 2021-04-23 RX ORDER — FENTANYL CITRATE 50 UG/ML
INJECTION, SOLUTION INTRAMUSCULAR; INTRAVENOUS AS NEEDED
Status: DISCONTINUED | OUTPATIENT
Start: 2021-04-23 | End: 2021-04-23 | Stop reason: SURG

## 2021-04-23 RX ORDER — PREGABALIN 75 MG/1
150 CAPSULE ORAL ONCE
Status: COMPLETED | OUTPATIENT
Start: 2021-04-23 | End: 2021-04-23

## 2021-04-23 RX ORDER — CEFAZOLIN SODIUM 2 G/100ML
2 INJECTION, SOLUTION INTRAVENOUS EVERY 8 HOURS
Status: COMPLETED | OUTPATIENT
Start: 2021-04-23 | End: 2021-04-24

## 2021-04-23 RX ORDER — SODIUM CHLORIDE, SODIUM LACTATE, POTASSIUM CHLORIDE, CALCIUM CHLORIDE 600; 310; 30; 20 MG/100ML; MG/100ML; MG/100ML; MG/100ML
50 INJECTION, SOLUTION INTRAVENOUS CONTINUOUS
Status: DISCONTINUED | OUTPATIENT
Start: 2021-04-23 | End: 2021-04-24 | Stop reason: HOSPADM

## 2021-04-23 RX ORDER — HYDROCODONE BITARTRATE AND ACETAMINOPHEN 7.5; 325 MG/1; MG/1
1 TABLET ORAL ONCE AS NEEDED
Status: DISCONTINUED | OUTPATIENT
Start: 2021-04-23 | End: 2021-04-23 | Stop reason: HOSPADM

## 2021-04-23 RX ORDER — ONDANSETRON 4 MG/1
4 TABLET, FILM COATED ORAL EVERY 6 HOURS PRN
Status: DISCONTINUED | OUTPATIENT
Start: 2021-04-23 | End: 2021-04-24 | Stop reason: HOSPADM

## 2021-04-23 RX ORDER — SODIUM CHLORIDE, SODIUM LACTATE, POTASSIUM CHLORIDE, CALCIUM CHLORIDE 600; 310; 30; 20 MG/100ML; MG/100ML; MG/100ML; MG/100ML
9 INJECTION, SOLUTION INTRAVENOUS CONTINUOUS
Status: DISCONTINUED | OUTPATIENT
Start: 2021-04-23 | End: 2021-04-23 | Stop reason: HOSPADM

## 2021-04-23 RX ADMIN — Medication 3 MG: at 21:12

## 2021-04-23 RX ADMIN — LETROZOLE 2.5 MG: 2.5 TABLET ORAL at 21:10

## 2021-04-23 RX ADMIN — PREGABALIN 150 MG: 75 CAPSULE ORAL at 07:41

## 2021-04-23 RX ADMIN — CEFAZOLIN SODIUM 2 G: 2 INJECTION, SOLUTION INTRAVENOUS at 09:28

## 2021-04-23 RX ADMIN — HYDROCODONE BITARTRATE AND ACETAMINOPHEN 1 TABLET: 7.5; 325 TABLET ORAL at 20:32

## 2021-04-23 RX ADMIN — PROPOFOL 25 MCG/KG/MIN: 10 INJECTION, EMULSION INTRAVENOUS at 09:50

## 2021-04-23 RX ADMIN — ONDANSETRON HYDROCHLORIDE 4 MG: 4 TABLET, FILM COATED ORAL at 15:35

## 2021-04-23 RX ADMIN — HYDROCHLOROTHIAZIDE: 12.5 CAPSULE ORAL at 17:50

## 2021-04-23 RX ADMIN — BUPIVACAINE HYDROCHLORIDE 1.4 ML: 7.5 INJECTION, SOLUTION EPIDURAL; RETROBULBAR at 09:47

## 2021-04-23 RX ADMIN — SODIUM CHLORIDE, POTASSIUM CHLORIDE, SODIUM LACTATE AND CALCIUM CHLORIDE 9 ML/HR: 600; 310; 30; 20 INJECTION, SOLUTION INTRAVENOUS at 08:27

## 2021-04-23 RX ADMIN — SODIUM CHLORIDE, POTASSIUM CHLORIDE, SODIUM LACTATE AND CALCIUM CHLORIDE 50 ML/HR: 600; 310; 30; 20 INJECTION, SOLUTION INTRAVENOUS at 15:36

## 2021-04-23 RX ADMIN — VANCOMYCIN HYDROCHLORIDE 1500 MG: 10 INJECTION, POWDER, LYOPHILIZED, FOR SOLUTION INTRAVENOUS at 07:43

## 2021-04-23 RX ADMIN — SERTRALINE HYDROCHLORIDE 50 MG: 50 TABLET, FILM COATED ORAL at 17:50

## 2021-04-23 RX ADMIN — FENTANYL CITRATE 50 MCG: 50 INJECTION INTRAMUSCULAR; INTRAVENOUS at 09:37

## 2021-04-23 RX ADMIN — PHENYLEPHRINE HYDROCHLORIDE 100 MCG: 10 INJECTION INTRAVENOUS at 10:16

## 2021-04-23 RX ADMIN — FENTANYL CITRATE 50 MCG: 50 INJECTION INTRAMUSCULAR; INTRAVENOUS at 09:41

## 2021-04-23 RX ADMIN — CEFAZOLIN SODIUM 2 G: 2 INJECTION, SOLUTION INTRAVENOUS at 17:49

## 2021-04-23 RX ADMIN — POTASSIUM CHLORIDE 20 MEQ: 750 TABLET, EXTENDED RELEASE ORAL at 17:50

## 2021-04-23 RX ADMIN — MUPIROCIN 1 APPLICATION: 20 OINTMENT TOPICAL at 20:31

## 2021-04-23 RX ADMIN — DEXAMETHASONE SODIUM PHOSPHATE 8 MG: 4 INJECTION, SOLUTION INTRAMUSCULAR; INTRAVENOUS at 10:06

## 2021-04-23 RX ADMIN — TRANEXAMIC ACID 1000 MG: 1 INJECTION, SOLUTION INTRAVENOUS at 10:53

## 2021-04-23 RX ADMIN — HYDROCODONE BITARTRATE AND ACETAMINOPHEN 1 TABLET: 7.5; 325 TABLET ORAL at 15:35

## 2021-04-23 NOTE — ANESTHESIA PREPROCEDURE EVALUATION
Anesthesia Evaluation     Patient summary reviewed and Nursing notes reviewed   no history of anesthetic complications:  NPO Solid Status: > 8 hours  NPO Liquid Status: > 2 hours           Airway   Mallampati: II  TM distance: >3 FB  Neck ROM: full  no difficulty expected  Dental - normal exam     Pulmonary - negative pulmonary ROS and normal exam   (-) COPD, asthma, not a smoker, lung cancer  Cardiovascular - normal exam  Exercise tolerance: good (4-7 METS)    ECG reviewed  Rhythm: regular  Rate: normal    (+) hypertension well controlled less than 2 medications, hyperlipidemia,   (-) valvular problems/murmurs, past MI, CAD, dysrhythmias, angina, CHF, cardiac stents, CABG, pericardial effusion      Neuro/Psych  (+) psychiatric history Depression,     (-) seizures, TIA, CVA  GI/Hepatic/Renal/Endo    (+) obesity,   diabetes mellitus type 2 well controlled,   (-) hiatal hernia, GERD, PUD, hepatitis, liver disease, no renal disease, GI bleed    Musculoskeletal     Abdominal  - normal exam   Substance History - negative use     OB/GYN          Other   arthritis,    history of cancer remission                    Anesthesia Plan    ASA 3     spinal       Anesthetic plan, all risks, benefits, and alternatives have been provided, discussed and informed consent has been obtained with: patient.    Plan discussed with CRNA.

## 2021-04-23 NOTE — ANESTHESIA POSTPROCEDURE EVALUATION
"Patient: Komal Sheffield    Procedure Summary     Date: 04/23/21 Room / Location: [facility] OR  / [facility] MAIN OR    Anesthesia Start: 0932 Anesthesia Stop: 1148    Procedure: TOTAL KNEE ARTHROPLASTY (Left Knee) Diagnosis:       Primary osteoarthritis of left knee      (Primary osteoarthritis of left knee [M17.12])    Surgeons: Valerio Barakat MD Provider: Elijah Toscano MD    Anesthesia Type: spinal ASA Status: 3          Anesthesia Type: spinal    Vitals  Vitals Value Taken Time   /94 04/23/21 1300   Temp 36.4 °C (97.5 °F) 04/23/21 1145   Pulse 89 04/23/21 1314   Resp 16 04/23/21 1300   SpO2 94 % 04/23/21 1314   Vitals shown include unvalidated device data.        Post Anesthesia Care and Evaluation    Patient location during evaluation: bedside  Patient participation: complete - patient participated  Level of consciousness: awake and alert  Pain score: 0  Pain management: adequate  Airway patency: patent  Anesthetic complications: No anesthetic complications    Cardiovascular status: acceptable  Respiratory status: acceptable  Hydration status: acceptable    Comments: /94   Pulse 89   Temp 36.4 °C (97.5 °F) (Oral)   Resp 16   Ht 160 cm (63\")   Wt 96 kg (211 lb 10.3 oz)   SpO2 94%   BMI 37.49 kg/m²       "

## 2021-04-23 NOTE — ANESTHESIA PROCEDURE NOTES
Spinal Block      Patient location during procedure: OR  Start Time: 4/23/2021 9:45 AM  Performed By  Anesthesiologist: Elijah Toscano MD  CRNA: Josephine Lindquist CRNA  Preanesthetic Checklist  Completed: patient identified, IV checked, site marked, risks and benefits discussed, surgical consent, monitors and equipment checked, pre-op evaluation and timeout performed  Spinal Block Prep:  Patient Position:sitting  Sterile Tech:cap, gloves, mask and sterile barriers  Prep:Chloraprep  Patient Monitoring:blood pressure monitoring, continuous pulse oximetry and EKG  Spinal Block Procedure  Approach:midline  Guidance:landmark technique  Location:L2-L3  Needle Type:Sprotte  Needle Gauge:24 G  Placement of Spinal needle event:cerebrospinal fluid aspirated  Paresthesia: no  Fluid Appearance:clear  Medications: bupivacaine PF (MARCAINE) 0.75 % injection, 1.4 mL  Med Administered at 4/23/2021 9:47 AM   Post Assessment  Patient Tolerance:patient tolerated the procedure well with no apparent complications  Complications no

## 2021-04-24 VITALS
SYSTOLIC BLOOD PRESSURE: 110 MMHG | WEIGHT: 211.64 LBS | HEIGHT: 63 IN | DIASTOLIC BLOOD PRESSURE: 68 MMHG | OXYGEN SATURATION: 97 % | BODY MASS INDEX: 37.5 KG/M2 | TEMPERATURE: 96.9 F | HEART RATE: 72 BPM | RESPIRATION RATE: 18 BRPM

## 2021-04-24 LAB
ANION GAP SERPL CALCULATED.3IONS-SCNC: 14.8 MMOL/L (ref 5–15)
BUN SERPL-MCNC: 14 MG/DL (ref 8–23)
BUN/CREAT SERPL: 14.9 (ref 7–25)
CALCIUM SPEC-SCNC: 8.5 MG/DL (ref 8.6–10.5)
CHLORIDE SERPL-SCNC: 104 MMOL/L (ref 98–107)
CO2 SERPL-SCNC: 22.2 MMOL/L (ref 22–29)
CREAT SERPL-MCNC: 0.94 MG/DL (ref 0.57–1)
GFR SERPL CREATININE-BSD FRML MDRD: 58 ML/MIN/1.73
GLUCOSE BLDC GLUCOMTR-MCNC: 179 MG/DL (ref 70–130)
GLUCOSE BLDC GLUCOMTR-MCNC: 228 MG/DL (ref 70–130)
GLUCOSE SERPL-MCNC: 142 MG/DL (ref 65–99)
HCT VFR BLD AUTO: 33.9 % (ref 34–46.6)
HGB BLD-MCNC: 11.3 G/DL (ref 12–15.9)
POTASSIUM SERPL-SCNC: 3.7 MMOL/L (ref 3.5–5.2)
SODIUM SERPL-SCNC: 141 MMOL/L (ref 136–145)

## 2021-04-24 PROCEDURE — G0378 HOSPITAL OBSERVATION PER HR: HCPCS

## 2021-04-24 PROCEDURE — 63710000001 METFORMIN 1000 MG TABLET: Performed by: NURSE PRACTITIONER

## 2021-04-24 PROCEDURE — 85014 HEMATOCRIT: CPT | Performed by: NURSE PRACTITIONER

## 2021-04-24 PROCEDURE — 63710000001 MUPIROCIN 2 % OINTMENT: Performed by: NURSE PRACTITIONER

## 2021-04-24 PROCEDURE — 25010000002 KETOROLAC TROMETHAMINE PER 15 MG: Performed by: NURSE PRACTITIONER

## 2021-04-24 PROCEDURE — 25010000003 CEFAZOLIN IN DEXTROSE 2-4 GM/100ML-% SOLUTION: Performed by: NURSE PRACTITIONER

## 2021-04-24 PROCEDURE — A9270 NON-COVERED ITEM OR SERVICE: HCPCS | Performed by: NURSE PRACTITIONER

## 2021-04-24 PROCEDURE — 63710000001 ASPIRIN 81 MG TABLET DELAYED-RELEASE: Performed by: NURSE PRACTITIONER

## 2021-04-24 PROCEDURE — 82962 GLUCOSE BLOOD TEST: CPT

## 2021-04-24 PROCEDURE — 63710000001 POLYETHYLENE GLYCOL 17 G PACK: Performed by: NURSE PRACTITIONER

## 2021-04-24 PROCEDURE — 85018 HEMOGLOBIN: CPT | Performed by: NURSE PRACTITIONER

## 2021-04-24 PROCEDURE — 63710000001 POTASSIUM CHLORIDE 10 MEQ TABLET CONTROLLED-RELEASE: Performed by: NURSE PRACTITIONER

## 2021-04-24 PROCEDURE — 63710000001 SERTRALINE 50 MG TABLET: Performed by: NURSE PRACTITIONER

## 2021-04-24 PROCEDURE — 63710000001 HYDROCODONE-ACETAMINOPHEN 7.5-325 MG TABLET: Performed by: NURSE PRACTITIONER

## 2021-04-24 PROCEDURE — 63710000001 PANTOPRAZOLE 40 MG TABLET DELAYED-RELEASE: Performed by: NURSE PRACTITIONER

## 2021-04-24 PROCEDURE — 80048 BASIC METABOLIC PNL TOTAL CA: CPT | Performed by: NURSE PRACTITIONER

## 2021-04-24 RX ORDER — KETOROLAC TROMETHAMINE 30 MG/ML
15 INJECTION, SOLUTION INTRAMUSCULAR; INTRAVENOUS ONCE
Status: COMPLETED | OUTPATIENT
Start: 2021-04-24 | End: 2021-04-24

## 2021-04-24 RX ADMIN — SERTRALINE HYDROCHLORIDE 50 MG: 50 TABLET, FILM COATED ORAL at 07:57

## 2021-04-24 RX ADMIN — HYDROCODONE BITARTRATE AND ACETAMINOPHEN 1 TABLET: 7.5; 325 TABLET ORAL at 01:16

## 2021-04-24 RX ADMIN — ASPIRIN 81 MG: 81 TABLET, COATED ORAL at 07:57

## 2021-04-24 RX ADMIN — HYDROCODONE BITARTRATE AND ACETAMINOPHEN 1 TABLET: 7.5; 325 TABLET ORAL at 05:06

## 2021-04-24 RX ADMIN — SODIUM CHLORIDE 500 ML: 9 INJECTION, SOLUTION INTRAVENOUS at 09:28

## 2021-04-24 RX ADMIN — HYDROCODONE BITARTRATE AND ACETAMINOPHEN 2 TABLET: 7.5; 325 TABLET ORAL at 13:29

## 2021-04-24 RX ADMIN — POLYETHYLENE GLYCOL 3350 17 G: 17 POWDER, FOR SOLUTION ORAL at 07:57

## 2021-04-24 RX ADMIN — PANTOPRAZOLE SODIUM 40 MG: 40 TABLET, DELAYED RELEASE ORAL at 05:06

## 2021-04-24 RX ADMIN — CEFAZOLIN SODIUM 2 G: 2 INJECTION, SOLUTION INTRAVENOUS at 01:02

## 2021-04-24 RX ADMIN — KETOROLAC TROMETHAMINE 15 MG: 30 INJECTION, SOLUTION INTRAMUSCULAR at 14:31

## 2021-04-24 RX ADMIN — MUPIROCIN 1 APPLICATION: 20 OINTMENT TOPICAL at 07:57

## 2021-04-24 RX ADMIN — METFORMIN HYDROCHLORIDE 1000 MG: 1000 TABLET ORAL at 07:57

## 2021-04-24 RX ADMIN — POTASSIUM CHLORIDE 20 MEQ: 750 TABLET, EXTENDED RELEASE ORAL at 07:57

## 2021-04-24 RX ADMIN — HYDROCODONE BITARTRATE AND ACETAMINOPHEN 2 TABLET: 7.5; 325 TABLET ORAL at 09:22

## 2021-04-29 DIAGNOSIS — Z96.652 S/P TKR (TOTAL KNEE REPLACEMENT), LEFT: ICD-10-CM

## 2021-04-29 RX ORDER — HYDROCODONE BITARTRATE AND ACETAMINOPHEN 7.5; 325 MG/1; MG/1
TABLET ORAL
Qty: 40 TABLET | Refills: 0 | Status: SHIPPED | OUTPATIENT
Start: 2021-04-29 | End: 2021-05-06 | Stop reason: SDUPTHER

## 2021-04-29 NOTE — TELEPHONE ENCOUNTER
----- Message from Komal Sheffield sent at 4/29/2021  7:30 AM EDT -----  Regarding: Prescription Question  Contact: [phone]  I am almost out of Hydrocodone acetaminophen.  Could you please call in another prescription to [facility].    I am taking two pills every four hours to stay ahead of the pain.    Thank you.

## 2021-05-06 ENCOUNTER — OFFICE VISIT (OUTPATIENT)
Dept: ORTHOPEDIC SURGERY | Facility: CLINIC | Age: 76
End: 2021-05-06

## 2021-05-06 VITALS — HEIGHT: 63 IN | BODY MASS INDEX: 37.39 KG/M2 | TEMPERATURE: 97.5 F | WEIGHT: 211 LBS

## 2021-05-06 DIAGNOSIS — Z96.652 S/P TKR (TOTAL KNEE REPLACEMENT), LEFT: ICD-10-CM

## 2021-05-06 PROCEDURE — 99024 POSTOP FOLLOW-UP VISIT: CPT | Performed by: ORTHOPAEDIC SURGERY

## 2021-05-06 RX ORDER — HYDROCODONE BITARTRATE AND ACETAMINOPHEN 7.5; 325 MG/1; MG/1
TABLET ORAL
Qty: 30 TABLET | Refills: 0 | Status: SHIPPED | OUTPATIENT
Start: 2021-05-06 | End: 2021-07-22

## 2021-05-06 NOTE — PROGRESS NOTES
Komal Sheffield : 1945 MRN: [id number] DATE: 2021    DIAGNOSIS: 2 week follow up right total knee      SUBJECTIVE:Patient returns today for 2 week follow up of right total knee replacement. Patient reports doing well with no unusual complaints. Appears to be progressing appropriately.    OBJECTIVE:   Exam:. The incision is healing appropriately. No sign of infection. Range of motion is progressing as expected. The calf is soft and nontender with a negative Homans sign.    ASSESSMENT: 2 week status post right knee replacement.    PLAN: 1) Staples removed and steri strips applied   2) Order given for PT   3) Discontinue ISACC hose   4) Continue ice PRN   5) aspirin 81 mg orally every day for 1 month   6) Follow up in 6 weeks with repeat Xrays of right knee (3views)    Valerio Barakat MD  2021

## 2021-05-10 ENCOUNTER — TREATMENT (OUTPATIENT)
Dept: PHYSICAL THERAPY | Facility: CLINIC | Age: 76
End: 2021-05-10

## 2021-05-10 DIAGNOSIS — Z96.652 S/P TOTAL KNEE ARTHROPLASTY, LEFT: Primary | ICD-10-CM

## 2021-05-10 DIAGNOSIS — R26.2 DIFFICULTY WALKING DUE TO KNEE JOINT: ICD-10-CM

## 2021-05-10 DIAGNOSIS — M17.12 PRIMARY OSTEOARTHRITIS OF LEFT KNEE: ICD-10-CM

## 2021-05-10 PROCEDURE — G0283 ELEC STIM OTHER THAN WOUND: HCPCS | Performed by: PHYSICAL THERAPIST

## 2021-05-10 PROCEDURE — 97161 PT EVAL LOW COMPLEX 20 MIN: CPT | Performed by: PHYSICAL THERAPIST

## 2021-05-10 PROCEDURE — 97110 THERAPEUTIC EXERCISES: CPT | Performed by: PHYSICAL THERAPIST

## 2021-05-10 NOTE — PROGRESS NOTES
Physical Therapy Initial Evaluation and Plan of Care    Patient: Komal Sheffield   : 1945  Diagnosis/ICD-10 Code:  S/P total knee arthroplasty, left [Z96.652]  Referring practitioner: SABI Scruggs    Subjective Evaluation    History of Present Illness  Onset date: 5 years ago   Date of surgery: 2021  Mechanism of injury: Pt is a 76 y/o WF who reports to the clinic S/P left TKR on 2021-spent one night in the hospital-went home with home health PT 3x/week for 2 weeks-saw MD  stated everything looked good-referred to outpatient PT-will see pt in 6 weeks.  Pt with a prior Hx of a right TKR on 2018-did PT prior and post surgery.  Pt did prior injections-gel-into the left knee until they were not effective and decided to have the left knee replaced.  Pt has not had prior injuries or surgeries to left knee.        Subjective comment: Pt reports increased pain today and not sure why?  Pt took pain pill one hours prior to coming to PT.    Patient Occupation: retired  Quality of life: good    Pain  Current pain ratin  At worst pain ratin  Location: left anterior knee   Quality: throbbing  Relieving factors: medications, ice, support and rest (hydrocodone )  Aggravating factors: standing, ambulation, stairs and movement    Hand dominance: right    Diagnostic Tests  X-ray: normal (post surgery-pt states she was told on last MD visit that the replaced looked good., prior to surgery left OA knee )    Treatments  Previous treatment: physical therapy and injection treatment  Current treatment: medication  Patient Goals  Patient goals for therapy: decreased edema, decreased pain, increased motion, increased strength and independence with ADLs/IADLs             Objective          Observations   Left Knee   Positive for effusion and incision.     Additional Knee Observation Details  Pt with a left anterior incision over knee-healing well without signs of infection- post op glue intact.       Tenderness   Left Knee   Tenderness in the lateral joint line and medial joint line.     Additional Tenderness Details  Minimal point tenderness left medial and lateral knee     Active Range of Motion   Left Knee   Flexion: 89 degrees   Extension: 7 degrees     Right Knee   Flexion: 127 degrees   Extension: 0 degrees     Patellar Mobility   Left Knee Patellar tendons within functional limits include the medial and lateral. Hypomobile in the left superior and left inferior patellar tendon(s).     Right Knee Patellar tendons within functional limits include the medial, lateral, superior and inferior.     Strength/Myotome Testing     Left Hip   Planes of Motion   Flexion: 4  Abduction: 4  Adduction: 3    Right Hip   Planes of Motion   Flexion: 5  Abduction: 5  Adduction: 4    Left Knee   Flexion: 3  Extension: 3+  Quadriceps contraction: fair    Right Knee   Flexion: 5  Extension: 5  Quadriceps contraction: good    Tests     Left Hip   SLR: Negative.     Right Hip   SLR: Negative.   Left Ankle/Foot   Negative for Homans' sign.     Additional Tests Details  NA secondary to post-op left TKR     Swelling     Left Knee Girth Measurement (cm)   Joint line: 46 cm  10 cm above joint line: SP 50.3.  10 cm below joint line: IP 43.1     Right Knee Girth Measurement (cm)   Joint line: 42 cm  10 cm above joint line: SP 48.2.  10 cm below joint line: IP 41.4     Ambulation     Observational Gait   Gait: antalgic   Decreased walking speed, stride length, left stance time, left swing time and left step length.   Left foot contact pattern: foot flat    Additional Observational Gait Details  Pt ambulates into clinic with single point cane in right UE, left foot flat gait pattern, knee extended, and moderate antalgic gait           Assessment & Plan     Assessment  Impairments: abnormal gait, abnormal or restricted ROM, activity intolerance, impaired balance, impaired physical strength, lacks appropriate home exercise program, pain  with function and weight-bearing intolerance  Assessment details: Pt is a 76 y/o WF who reports to the clinic with c/o left knee pain, decreased flexibility, tenderness, knee effusion, decreased strength, decreased ROM, and decreased functional mobility secondary to having a left TKR.       Prognosis: good  Functional Limitations: walking, uncomfortable because of pain, standing, stooping and unable to perform repetitive tasks  Goals  Plan Goals: STGs: 2-4 weeks  1. Decrease left  knee pain 5/10 with standing, ambulation and steps.    2. Increase left knee AROM 0-110 degrees    3.  Decrease left medial and lateral knee joint tenderness to minimal to none with palpation    4.  Pt ambulates into clinic without an AD minimal antalgic gait left LE.    5.  Decrease left knee effusion at girth measurements by at least 2 cm.      LTGs: 4-8 weeks  1.  Pt Independent with HEP.  2.  Increase left knee AROM 0-125 degrees   3.  Increase left knee strength to 5/5    4.  Increase left hip flex, abd and add strength to 4+-5/5  5.  Pt ambulates left LE without an antalgic gait.  6.  Pt is able to ambulate, stand, and ascend/descend steps with a HR independently with a < or = 3/10 left knee pain.        Plan  Therapy options: will be seen for skilled physical therapy services  Planned modality interventions: cryotherapy, electrical stimulation/Russian stimulation and thermotherapy (hydrocollator packs)  Planned therapy interventions: joint mobilization, home exercise program, gait training, flexibility, strengthening, stretching, functional ROM exercises, therapeutic activities, soft tissue mobilization and manual therapy  Treatment plan discussed with: patient  Plan details: 24 visits         Manual Therapy:    6     mins  85239;  Therapeutic Exercise:   23      mins  06131;     Neuromuscular Fina:        mins  38058;    Therapeutic Activity:          mins  29203;     Gait Training:           mins  16064;     Ultrasound:           mins  42143;    Electrical Stimulation:    15     mins  99063 ( );  Dry Needling          mins self-pay    Timed Treatment:  29    mins   Total Treatment:     59   mins    PT SIGNATURE: Alondra Burris, PT   DATE TREATMENT INITIATED: 5/10/2021    Medicare Initial Certification  Certification Period: 8/8/2021  I certify that the therapy services are furnished while this patient is under my care.  The services outlined above are required by this patient, and will be reviewed every 90 days.     PHYSICIAN: Carlos A Sanders, APRN      DATE:     Please sign and return via fax to [phone].. Thank you, [facility].

## 2021-05-12 ENCOUNTER — TREATMENT (OUTPATIENT)
Dept: PHYSICAL THERAPY | Facility: CLINIC | Age: 76
End: 2021-05-12

## 2021-05-12 DIAGNOSIS — G89.29 CHRONIC PAIN OF LEFT KNEE: ICD-10-CM

## 2021-05-12 DIAGNOSIS — M25.562 CHRONIC PAIN OF LEFT KNEE: ICD-10-CM

## 2021-05-12 DIAGNOSIS — M17.12 PRIMARY OSTEOARTHRITIS OF LEFT KNEE: ICD-10-CM

## 2021-05-12 DIAGNOSIS — R26.2 DIFFICULTY WALKING DUE TO KNEE JOINT: ICD-10-CM

## 2021-05-12 DIAGNOSIS — Z96.652 S/P TOTAL KNEE ARTHROPLASTY, LEFT: Primary | ICD-10-CM

## 2021-05-12 PROCEDURE — G0283 ELEC STIM OTHER THAN WOUND: HCPCS | Performed by: PHYSICAL THERAPIST

## 2021-05-12 PROCEDURE — 97140 MANUAL THERAPY 1/> REGIONS: CPT | Performed by: PHYSICAL THERAPIST

## 2021-05-12 PROCEDURE — 97110 THERAPEUTIC EXERCISES: CPT | Performed by: PHYSICAL THERAPIST

## 2021-05-12 NOTE — PROGRESS NOTES
Physical Therapy Daily Progress Note    Visit # : 2  Komal Sheffield reports: her knee felt really good after last visit. States this morning it is sore and stiff.        Subjective     Objective          Active Range of Motion   Left Knee   Flexion: 100 degrees       See Exercise, Manual, and Modality Logs for complete treatment.       Assessment & Plan     Assessment  Assessment details: Pt is responding to treatment with improving ROM and decreasing pain after treatment.  Pt needs cueing to flex left knee during gait due to pt wanting to circumduct left LE during swing phase.  Pt is tolerating all new strengthening exercises, but had difficulty with sidelying hip add.  Will continue to see pt 3x/week for stretching, strengthening, and modalities PRN for pain.          Progress per Plan of Care           Manual Therapy:  10     mins  84954;  Therapeutic Exercise:   32      mins  69222;     Neuromuscular Fina:        mins  35849;    Therapeutic Activity:          mins  32228;     Gait Training:           mins  43913;     Ultrasound:          mins  63211;    Electrical Stimulation:    15     mins  28684 ( );  Dry Needling          mins self-pay    Timed Treatment:  42    mins   Total Treatment:    65    mins    Alondra Burris, PT  Physical Therapist

## 2021-05-14 ENCOUNTER — TELEPHONE (OUTPATIENT)
Dept: PHYSICAL THERAPY | Facility: CLINIC | Age: 76
End: 2021-05-14

## 2021-05-17 ENCOUNTER — TREATMENT (OUTPATIENT)
Dept: PHYSICAL THERAPY | Facility: CLINIC | Age: 76
End: 2021-05-17

## 2021-05-17 DIAGNOSIS — M17.11 PRIMARY OSTEOARTHRITIS OF RIGHT KNEE: ICD-10-CM

## 2021-05-17 DIAGNOSIS — R29.898 DIFFICULTY IN WEIGHT BEARING: ICD-10-CM

## 2021-05-17 DIAGNOSIS — M25.562 CHRONIC PAIN OF LEFT KNEE: ICD-10-CM

## 2021-05-17 DIAGNOSIS — R26.2 DIFFICULTY WALKING DUE TO KNEE JOINT: ICD-10-CM

## 2021-05-17 DIAGNOSIS — Z96.652 S/P TOTAL KNEE ARTHROPLASTY, LEFT: Primary | ICD-10-CM

## 2021-05-17 DIAGNOSIS — M17.12 PRIMARY OSTEOARTHRITIS OF LEFT KNEE: ICD-10-CM

## 2021-05-17 DIAGNOSIS — G89.29 CHRONIC PAIN OF LEFT KNEE: ICD-10-CM

## 2021-05-17 PROCEDURE — 97140 MANUAL THERAPY 1/> REGIONS: CPT | Performed by: PHYSICAL THERAPIST

## 2021-05-17 PROCEDURE — 97010 HOT OR COLD PACKS THERAPY: CPT | Performed by: PHYSICAL THERAPIST

## 2021-05-17 PROCEDURE — 97112 NEUROMUSCULAR REEDUCATION: CPT | Performed by: PHYSICAL THERAPIST

## 2021-05-17 PROCEDURE — 97110 THERAPEUTIC EXERCISES: CPT | Performed by: PHYSICAL THERAPIST

## 2021-05-17 PROCEDURE — G0283 ELEC STIM OTHER THAN WOUND: HCPCS | Performed by: PHYSICAL THERAPIST

## 2021-05-17 NOTE — PROGRESS NOTES
Physical Therapy Daily Progress Note    Patient: Komal Sheffield   : 1945  Diagnosis/ICD-10 Code:  S/P total knee arthroplasty, left [Z96.652]  Referring practitioner: SABI Scruggs  Date of Initial Visit: Type: THERAPY  Noted: 5/10/2021  Today's Date: 2021  Patient seen for 3 sessions           Subjective Doing well. Today was the first day I drove and it went really well. Just a little pain.     Objective   See Exercise, Manual, and Modality Logs for complete treatment.   Pt almost 4 weeks s/p L TKA   AAROM flexion with heel slides 94 deg, PROM 106 deg    Assessment/Plan  Pt progressing well with program. Expected pain at end range with manual therapy. Tolerated adding step ups and stair stretch without issue today.          Timed:    Manual Therapy:   9      mins  65159;  Therapeutic Exercise:    14     mins  66139;     Neuromuscular Fina:    16    mins  09188;    Therapeutic Activity:          mins  07507;     Gait Training:           mins  02208;     Ultrasound:          mins  94036;    Electrical Stimulation:         mins  00085 ( );  Iontophoresis         mins 16100;  Aquatic Therapy         mins 63807;  Dry Needling                   mins    Untimed:  Electrical Stimulation:    15     mins  75294 (MC );  Mechanical Traction:         mins  77777;     Timed Treatment:   39   mins   Total Treatment:     60   mins  Leti Cannon, PT  Physical Therapist

## 2021-05-19 ENCOUNTER — TREATMENT (OUTPATIENT)
Dept: PHYSICAL THERAPY | Facility: CLINIC | Age: 76
End: 2021-05-19

## 2021-05-19 DIAGNOSIS — M17.12 PRIMARY OSTEOARTHRITIS OF LEFT KNEE: ICD-10-CM

## 2021-05-19 DIAGNOSIS — Z96.652 S/P TOTAL KNEE ARTHROPLASTY, LEFT: Primary | ICD-10-CM

## 2021-05-19 DIAGNOSIS — G89.29 CHRONIC PAIN OF LEFT KNEE: ICD-10-CM

## 2021-05-19 DIAGNOSIS — R26.2 DIFFICULTY WALKING DUE TO KNEE JOINT: ICD-10-CM

## 2021-05-19 DIAGNOSIS — M25.562 CHRONIC PAIN OF LEFT KNEE: ICD-10-CM

## 2021-05-19 PROCEDURE — G0283 ELEC STIM OTHER THAN WOUND: HCPCS | Performed by: PHYSICAL THERAPIST

## 2021-05-19 PROCEDURE — 97140 MANUAL THERAPY 1/> REGIONS: CPT | Performed by: PHYSICAL THERAPIST

## 2021-05-19 PROCEDURE — 97530 THERAPEUTIC ACTIVITIES: CPT | Performed by: PHYSICAL THERAPIST

## 2021-05-19 PROCEDURE — 97110 THERAPEUTIC EXERCISES: CPT | Performed by: PHYSICAL THERAPIST

## 2021-05-19 NOTE — PROGRESS NOTES
Physical Therapy Daily Progress Note    Visit # : 4  Komal Sheffield reports: she was really sore after Monday's treatment and it took about two days to recover.  Pt states it feels better today, but it still aches.      Subjective     Objective          Active Range of Motion   Left Knee   Flexion: 109 degrees   Extension: 1 degrees       See Exercise, Manual, and Modality Logs for complete treatment.       Assessment & Plan     Assessment  Assessment details: Pt is responding to treatment with improving ROM and functional mobility.  Pt had increased pain after last visit, but states pain is better today.   Pt needs cueing to flex left knee during gait due to pt wanting to circumduct left LE during swing phase, so added disc walking.  Post treatment pt was able to heel strike to toe off on the left knee with minimal cueing.  Added LAQ and increased reps with strengthening exercises today.  Pt was able to complete all exercises without c/o pain, but fatigues with SLR.   Will continue to see pt 3x/week for stretching, strengthening, and modalities PRN for pain.          Progress per Plan of Care           Manual Therapy:   10      mins  83177;  Therapeutic Exercise:   35      mins  28834;     Neuromuscular Fina:        mins  32654;    Therapeutic Activity:    10      mins  08067;     Gait Training:           mins  48537;     Ultrasound:          mins  75042;    Electrical Stimulation:  15       mins  14501 ( );  Dry Needling          mins self-pay    Timed Treatment:  55    mins   Total Treatment:    72    mins    Alondra Burris, PT  Physical Therapist

## 2021-05-21 ENCOUNTER — TELEPHONE (OUTPATIENT)
Dept: PHYSICAL THERAPY | Facility: CLINIC | Age: 76
End: 2021-05-21

## 2021-05-24 ENCOUNTER — TREATMENT (OUTPATIENT)
Dept: PHYSICAL THERAPY | Facility: CLINIC | Age: 76
End: 2021-05-24

## 2021-05-24 DIAGNOSIS — M25.562 CHRONIC PAIN OF LEFT KNEE: ICD-10-CM

## 2021-05-24 DIAGNOSIS — M17.12 PRIMARY OSTEOARTHRITIS OF LEFT KNEE: ICD-10-CM

## 2021-05-24 DIAGNOSIS — R26.2 DIFFICULTY WALKING DUE TO KNEE JOINT: ICD-10-CM

## 2021-05-24 DIAGNOSIS — G89.29 CHRONIC PAIN OF LEFT KNEE: ICD-10-CM

## 2021-05-24 DIAGNOSIS — Z96.652 S/P TOTAL KNEE ARTHROPLASTY, LEFT: Primary | ICD-10-CM

## 2021-05-24 PROCEDURE — 97530 THERAPEUTIC ACTIVITIES: CPT | Performed by: PHYSICAL THERAPIST

## 2021-05-24 PROCEDURE — 97140 MANUAL THERAPY 1/> REGIONS: CPT | Performed by: PHYSICAL THERAPIST

## 2021-05-24 PROCEDURE — 97110 THERAPEUTIC EXERCISES: CPT | Performed by: PHYSICAL THERAPIST

## 2021-05-24 PROCEDURE — G0283 ELEC STIM OTHER THAN WOUND: HCPCS | Performed by: PHYSICAL THERAPIST

## 2021-05-24 NOTE — PROGRESS NOTES
Physical Therapy Daily Progress Note    Visit # : 5  Komal Sheffield reports: she sent a message to Dr. Barakat on Friday due to having a low grade fever for 3 weeks with an upset stomach and the knee constantly aching.  Pt states she is taking nothing, but Tylenol and the fever will go away but then returns once the Tylenol wears off.  Pt states she has not heard anything back yet.      Subjective     Objective          Active Range of Motion   Left Knee   Extension: 0 degrees       See Exercise, Manual, and Modality Logs for complete treatment.       Assessment & Plan     Assessment  Assessment details: Pt is responding to treatment with improving ROM and functional mobility.  Pt tolerated the increases in reps with exercises, but still fatigues easily with SLR.  Pt is c/o constant aching in her left knee, but tolerates all treatment and stretches without increased pain.   Plan to add leg press and SLB next visit.  Will continue to see pt 3x/week for stretching, strengthening, and modalities PRN for pain.        Progress per Plan of Care           Manual Therapy:  10       mins  41964;  Therapeutic Exercise:   28      mins  93214;     Neuromuscular Fina:        mins  02869;    Therapeutic Activity:    15      mins  25640;     Gait Training:           mins  35532;     Ultrasound:          mins  50854;    Electrical Stimulation:   15      mins  05292 ( );  Dry Needling          mins self-pay    Timed Treatment:  53    mins   Total Treatment:     75   mins    Alondra Burris, PT  Physical Therapist

## 2021-05-25 ENCOUNTER — TELEPHONE (OUTPATIENT)
Dept: ORTHOPEDIC SURGERY | Facility: CLINIC | Age: 76
End: 2021-05-25

## 2021-05-26 ENCOUNTER — TELEPHONE (OUTPATIENT)
Dept: PHYSICAL THERAPY | Facility: CLINIC | Age: 76
End: 2021-05-26

## 2021-05-28 ENCOUNTER — TREATMENT (OUTPATIENT)
Dept: PHYSICAL THERAPY | Facility: CLINIC | Age: 76
End: 2021-05-28

## 2021-05-28 DIAGNOSIS — R26.2 DIFFICULTY WALKING DUE TO KNEE JOINT: ICD-10-CM

## 2021-05-28 DIAGNOSIS — Z96.652 S/P TOTAL KNEE ARTHROPLASTY, LEFT: Primary | ICD-10-CM

## 2021-05-28 DIAGNOSIS — M17.12 PRIMARY OSTEOARTHRITIS OF LEFT KNEE: ICD-10-CM

## 2021-05-28 DIAGNOSIS — G89.29 CHRONIC PAIN OF LEFT KNEE: ICD-10-CM

## 2021-05-28 DIAGNOSIS — M25.562 CHRONIC PAIN OF LEFT KNEE: ICD-10-CM

## 2021-05-28 PROCEDURE — 97530 THERAPEUTIC ACTIVITIES: CPT | Performed by: PHYSICAL THERAPIST

## 2021-05-28 PROCEDURE — 97110 THERAPEUTIC EXERCISES: CPT | Performed by: PHYSICAL THERAPIST

## 2021-05-28 PROCEDURE — G0283 ELEC STIM OTHER THAN WOUND: HCPCS | Performed by: PHYSICAL THERAPIST

## 2021-05-28 NOTE — PROGRESS NOTES
Physical Therapy Daily Progress Note        Patient: Komal Sheffield   : 1945  Diagnosis/ICD-10 Code:  S/P total knee arthroplasty, left [Z96.652]  Referring practitioner: SABI Scruggs  Date of Initial Visit: Type: THERAPY  Noted: 5/10/2021  Today's Date: 2021  Patient seen for 6 sessions         Komal Sheffield reports: her knee is aching today and thinks it is from the weather.  She said no problems with her knee since last session but her stomach has been upset.  She finally got into her GP and got medicine yesterday so hopefully that will help.  Talked to surgeon and he wasn't concerned with low grade fever.  She also saw GP yesterday and she did all kinds of bloodwork and rearranged some medicine but also wasn't very concerned about it.         Subjective     Objective          Active Range of Motion   Left Knee   Flexion: 110 degrees   Extension: 0 degrees     Passive Range of Motion   Left Knee   Flexion: 111 degrees       See Exercise, Manual, and Modality Logs for complete treatment.       Assessment/Plan  Improved flexion ROM as well as able to progress strengthening this date without complaints.  Progressions included step downs to assist with strength, ROM, and balance to improve amb of stairs.  She continued to amb with decreased knee flexion during swing phase however able to correct with amb over discs.  May progress to higher obstacle next session to reinforce motion.  Will monitor tolerance to exercises and continue to progress as tolerated.     Progress per Plan of Care           Manual Therapy:    8     mins  19316;  Therapeutic Exercise:     25    mins  91032;     Neuromuscular Fina:        mins  10885;    Therapeutic Activity:     12     mins  69549;     Gait Training:           mins  50866;     Ultrasound:          mins  46889;    Electrical Stimulation:    15     mins  35507 ( );  Dry Needling          mins self-pay    Timed Treatment:   45   mins   Total Treatment:      70   mins    Sulma Duncan, HOLLI  Physical Therapist Assistant

## 2021-06-01 ENCOUNTER — TREATMENT (OUTPATIENT)
Dept: PHYSICAL THERAPY | Facility: CLINIC | Age: 76
End: 2021-06-01

## 2021-06-01 DIAGNOSIS — Z96.652 S/P TOTAL KNEE ARTHROPLASTY, LEFT: Primary | ICD-10-CM

## 2021-06-01 DIAGNOSIS — M17.12 PRIMARY OSTEOARTHRITIS OF LEFT KNEE: ICD-10-CM

## 2021-06-01 DIAGNOSIS — M25.562 CHRONIC PAIN OF LEFT KNEE: ICD-10-CM

## 2021-06-01 DIAGNOSIS — R26.2 DIFFICULTY WALKING DUE TO KNEE JOINT: ICD-10-CM

## 2021-06-01 DIAGNOSIS — G89.29 CHRONIC PAIN OF LEFT KNEE: ICD-10-CM

## 2021-06-01 NOTE — PROGRESS NOTES
"Physical Therapy Daily Progress Note        Visit # : 7  Komal Sheffield reports: she is not feeling well today.  \"I'm not going to stay today to do my therapy.\"  Pt reports she had to take a Zofran on the way over here due to the increased nausea.  Pt states it took her 2 hours to get ready.  Pt did go to MD and had labs performed.  She did have a UTI, so that maybe some of the cause of her symptoms.  Pt explained that she is going to do her best to do her therapy, but some days she may just have to cancel.       Subjective     Objective   See Exercise, Manual, and Modality Logs for complete treatment.     Holding all treatment today.  Explained to pt to continue the stretching at least at home on the days she feels nauseated, so she does not lose ROM.      Assessment & Plan     Assessment  Assessment details: Pt with increased symptoms of nausea on her way to treatment.  Holding treatment today and pt to continue at home with her HEP.  Will see pt on Friday 6/4/2021.          Progress per Plan of Care           Manual Therapy:         mins  18653;  Therapeutic Exercise:         mins  66714;     Neuromuscular Fina:        mins  91893;    Therapeutic Activity:          mins  86464;     Gait Training:           mins  63357;     Ultrasound:          mins  89924;    Electrical Stimulation:         mins  47891 ( );  Dry Needling          mins self-pay    Timed Treatment:   0   mins   Total Treatment:    5    mins    Alondra Burris, PT  Physical Therapist  "

## 2021-06-07 ENCOUNTER — TREATMENT (OUTPATIENT)
Dept: PHYSICAL THERAPY | Facility: CLINIC | Age: 76
End: 2021-06-07

## 2021-06-07 DIAGNOSIS — R26.2 DIFFICULTY WALKING DUE TO KNEE JOINT: ICD-10-CM

## 2021-06-07 DIAGNOSIS — G89.29 CHRONIC PAIN OF LEFT KNEE: ICD-10-CM

## 2021-06-07 DIAGNOSIS — M25.562 CHRONIC PAIN OF LEFT KNEE: ICD-10-CM

## 2021-06-07 DIAGNOSIS — M17.12 PRIMARY OSTEOARTHRITIS OF LEFT KNEE: ICD-10-CM

## 2021-06-07 DIAGNOSIS — Z96.652 S/P TOTAL KNEE ARTHROPLASTY, LEFT: Primary | ICD-10-CM

## 2021-06-07 PROCEDURE — 97110 THERAPEUTIC EXERCISES: CPT | Performed by: PHYSICAL THERAPIST

## 2021-06-07 PROCEDURE — 97530 THERAPEUTIC ACTIVITIES: CPT | Performed by: PHYSICAL THERAPIST

## 2021-06-07 PROCEDURE — G0283 ELEC STIM OTHER THAN WOUND: HCPCS | Performed by: PHYSICAL THERAPIST

## 2021-06-07 NOTE — PROGRESS NOTES
Physical Therapy Daily Progress Note    Visit # : 7  Komal Sheffield reports: she is feeling so much better.  Pt states she has been working her knee.      Subjective     Objective          Active Range of Motion   Left Knee   Flexion: 113 degrees   Extension: 0 degrees       See Exercise, Manual, and Modality Logs for complete treatment.       Assessment & Plan     Assessment  Assessment details: Pt is doing well with improving ROM and tolerance to strengthening exercises since she is not feeling nauseated this week.  Pt was able to increase her TB to blue, added step downs, and add 2# weights to her SAQ/LAQ.  Pt denied having any increased pain during the new exercises.  Plan to add B leg press and SLB next visit.  Will continue to see pt 3x/week for stretching, strengthening, and modalities PRN for pain.          Progress per Plan of Care           Manual Therapy:  10       mins  72573;  Therapeutic Exercise:  17       mins  30381;     Neuromuscular Fina:        mins  70024;    Therapeutic Activity:   10       mins  40011;     Gait Training:           mins  92768;     Ultrasound:          mins  72670;    Electrical Stimulation:  15       mins  98613 ( );  Dry Needling          mins self-pay    Timed Treatment:  37    mins   Total Treatment:    73    mins    Alondra Burris, PT  Physical Therapist

## 2021-06-09 ENCOUNTER — TREATMENT (OUTPATIENT)
Dept: PHYSICAL THERAPY | Facility: CLINIC | Age: 76
End: 2021-06-09

## 2021-06-09 DIAGNOSIS — Z96.652 S/P TOTAL KNEE ARTHROPLASTY, LEFT: Primary | ICD-10-CM

## 2021-06-09 DIAGNOSIS — M25.562 CHRONIC PAIN OF LEFT KNEE: ICD-10-CM

## 2021-06-09 DIAGNOSIS — G89.29 CHRONIC PAIN OF LEFT KNEE: ICD-10-CM

## 2021-06-09 DIAGNOSIS — R26.2 DIFFICULTY WALKING DUE TO KNEE JOINT: ICD-10-CM

## 2021-06-09 DIAGNOSIS — M17.12 PRIMARY OSTEOARTHRITIS OF LEFT KNEE: ICD-10-CM

## 2021-06-09 PROCEDURE — 97140 MANUAL THERAPY 1/> REGIONS: CPT | Performed by: PHYSICAL THERAPIST

## 2021-06-09 PROCEDURE — 97110 THERAPEUTIC EXERCISES: CPT | Performed by: PHYSICAL THERAPIST

## 2021-06-09 PROCEDURE — 97530 THERAPEUTIC ACTIVITIES: CPT | Performed by: PHYSICAL THERAPIST

## 2021-06-09 PROCEDURE — G0283 ELEC STIM OTHER THAN WOUND: HCPCS | Performed by: PHYSICAL THERAPIST

## 2021-06-09 NOTE — PROGRESS NOTES
Re-Assessment / Re-Certification        Patient: Komal Sheffield   : 1945  Diagnosis/ICD-10 Code:  S/P total knee arthroplasty, left [Z96.652]  Referring practitioner: SABI Scruggs  Date of Initial Visit: 2021  Today's Date: 2021  Patient seen for 8 sessions      Subjective:   Komal Sheffield reports: she is feeling so much better since last week.  Pt rates her pain a 2-3/10 and describes it as a throbbing pain.    Subjective Questionnaire: LEFS: 56/80  Clinical Progress: improved  Home Program Compliance: Yes  Treatment has included: therapeutic exercise, manual therapy, therapeutic activity, gait training, electrical stimulation, cryotherapy and pt was instructed in a HEP    Subjective   Objective          Tenderness   Left Knee   Tenderness in the pes anserinus.     Additional Tenderness Details  Mild tenderness over the right pes area     Active Range of Motion   Left Knee   Flexion: 115 degrees   Extension: 0 degrees     Strength/Myotome Testing     Left Hip   Planes of Motion   Flexion: 4  Abduction: 4+  Adduction: 4+    Left Knee   Flexion: 5  Extension: 4+    Swelling     Left Knee Girth Measurement (cm)   Joint line: MJ 42.8.  10 cm above joint line: SP 47.1.  10 cm below joint line: IP 40.3.    Ambulation     Observational Gait   Stride length, left swing time and left step length within functional limits. Decreased walking speed and left stance time.   Left foot contact pattern: heel to toe    Additional Observational Gait Details  Pt ambulates into clinic without an AD with a slight antalgic gait left LE.        Assessment & Plan     Assessment  Assessment details: Pt is doing well with therapy.  Pt has met all STGs and 2/6 LTGs.  Pt is making progress towards accomplishing all goals.  Pt with improving ROM and strength, decreasing knee effusion, improving functional mobility, and decreasing pain. Pt tolerated the increases in strengthening exercises well.  Plan to decrease frequency to  2x/week for another month with plans to discharge to a HEP.         Progress toward previous goals: Partially Met    Goals  Plan Goals: STGs: 2-4 weeks  1. Decrease left  knee pain 5/10 with standing, ambulation and steps.  MET   2. Increase left knee AROM 0-110 degrees  MET   3.  Decrease left medial and lateral knee joint tenderness to minimal to none with palpation  MET   4.  Pt ambulates into clinic without an AD minimal antalgic gait left LE.  MET   5.  Decrease left knee effusion at girth measurements by at least 2 cm.  MET     LTGs: 4-8 weeks  1.  Pt Independent with HEP.  MET   2.  Increase left knee AROM 0-125 degrees   NOT MET   3.  Increase left knee strength to 5/5    NOT MET   4.  Increase left hip flex, abd and add strength to 4+-5/5  PROGRESSING   5.  Pt ambulates left LE without an antalgic gait.  NOT MET   6.  Pt is able to ambulate, stand, and ascend/descend steps with a HR independently with a < or = 3/10 left knee pain.  Met     Recommendations: Continue as planned  Timeframe: 1 month  Prognosis to achieve goals: good    PT Signature: Alondra Burris, PT      Based upon review of the patient's progress and continued therapy plan, it is my medical opinion that Komal Sheffield should continue physical therapy treatment at [facility]  [address] [place] [id number] [phone].    Signature: __________________________________  Carlos A Sanders APRN    Manual Therapy:    10     mins  61092;  Therapeutic Exercise:   30      mins  23144;     Neuromuscular Fina:        mins  55084;    Therapeutic Activity:   15       mins  82236;     Gait Training:           mins  09037;     Ultrasound:          mins  28314;    Electrical Stimulation:    15     mins  54233 ( );  Dry Needling          mins self-pay    Timed Treatment:  55    mins   Total Treatment:    74    mins

## 2021-06-11 ENCOUNTER — TREATMENT (OUTPATIENT)
Dept: PHYSICAL THERAPY | Facility: CLINIC | Age: 76
End: 2021-06-11

## 2021-06-11 DIAGNOSIS — M25.562 CHRONIC PAIN OF LEFT KNEE: ICD-10-CM

## 2021-06-11 DIAGNOSIS — G89.29 CHRONIC PAIN OF LEFT KNEE: ICD-10-CM

## 2021-06-11 DIAGNOSIS — R26.2 DIFFICULTY WALKING DUE TO KNEE JOINT: ICD-10-CM

## 2021-06-11 DIAGNOSIS — Z96.652 S/P TOTAL KNEE ARTHROPLASTY, LEFT: Primary | ICD-10-CM

## 2021-06-11 DIAGNOSIS — M17.12 PRIMARY OSTEOARTHRITIS OF LEFT KNEE: ICD-10-CM

## 2021-06-11 PROCEDURE — 97530 THERAPEUTIC ACTIVITIES: CPT | Performed by: PHYSICAL THERAPIST

## 2021-06-11 PROCEDURE — 97110 THERAPEUTIC EXERCISES: CPT | Performed by: PHYSICAL THERAPIST

## 2021-06-11 NOTE — PROGRESS NOTES
Physical Therapy Daily Progress Note        Patient: Komal Sheffield   : 1945  Diagnosis/ICD-10 Code:  S/P total knee arthroplasty, left [Z96.652]  Referring practitioner: SABI Scruggs  Date of Initial Visit: Type: THERAPY  Noted: 5/10/2021  Today's Date: 2021  Patient seen for 9 sessions         Komal Sheffield reports: no pain today.  She said her knee is doing really good.         Subjective     Objective   See Exercise, Manual, and Modality Logs for complete treatment.       Assessment/Plan   Pt tolerated exercises well including progressions and reported no increased pain at the end of the session.  Attempted to progress step down height with traditional step downs and step up and overs however unable to tolerate with good technique thus returned to previous height. Held estim this date d/t pt report of no pain pre or post session.  Continued with ice and will assess tolerance next session and reintroduce if necessary.        Progress per Plan of Care           Manual Therapy:    5     mins  32708;  Therapeutic Exercise:    23     mins  76981;     Neuromuscular Fina:        mins  32153;    Therapeutic Activity:    15      mins  74541;     Gait Training:           mins  00793;     Ultrasound:          mins  84013;    Electrical Stimulation:         mins  19986 ( );  Dry Needling          mins self-pay    Timed Treatment:   42   mins   Total Treatment:     52   mins    Sulma Duncan PTA  Physical Therapist Assistant

## 2021-06-14 ENCOUNTER — TREATMENT (OUTPATIENT)
Dept: PHYSICAL THERAPY | Facility: CLINIC | Age: 76
End: 2021-06-14

## 2021-06-14 DIAGNOSIS — G89.29 CHRONIC PAIN OF LEFT KNEE: ICD-10-CM

## 2021-06-14 DIAGNOSIS — M17.12 PRIMARY OSTEOARTHRITIS OF LEFT KNEE: ICD-10-CM

## 2021-06-14 DIAGNOSIS — Z96.652 S/P TOTAL KNEE ARTHROPLASTY, LEFT: Primary | ICD-10-CM

## 2021-06-14 DIAGNOSIS — R26.2 DIFFICULTY WALKING DUE TO KNEE JOINT: ICD-10-CM

## 2021-06-14 DIAGNOSIS — M25.562 CHRONIC PAIN OF LEFT KNEE: ICD-10-CM

## 2021-06-14 PROCEDURE — 97530 THERAPEUTIC ACTIVITIES: CPT | Performed by: PHYSICAL THERAPIST

## 2021-06-14 PROCEDURE — 97110 THERAPEUTIC EXERCISES: CPT | Performed by: PHYSICAL THERAPIST

## 2021-06-14 PROCEDURE — 97140 MANUAL THERAPY 1/> REGIONS: CPT | Performed by: PHYSICAL THERAPIST

## 2021-06-14 PROCEDURE — G0283 ELEC STIM OTHER THAN WOUND: HCPCS | Performed by: PHYSICAL THERAPIST

## 2021-06-14 NOTE — PROGRESS NOTES
Physical Therapy Daily Progress Note    Visit # : 10  Komal Sheffield reports: my knee is doing well; I was able to get on my riding mower this weekend.     Subjective     Objective          Tenderness   Left Knee   Tenderness in the MCL (distal) and patellar tendon.       See Exercise, Manual, and Modality Logs for complete treatment.   AAROM L knee flex with strap 112 degrees  PROM L knee flex 120 degrees following manual stretching    Assessment/Plan  Pt is progressing very well with ROM and strengthening. Pt is able to sit to stand without UE assist demonstrating good functional LE strength. Pt continues to exhibit ant knee pain descending stairs with associated patellar tendon tenderness so resumed IFC/ice to address inflammatory symptoms.  Progress strengthening /stabilization /functional activity       Timed:  Manual Therapy:    8     mins  34553;  Therapeutic Exercise:    35     mins  50200;     Neuromuscular Fina:    -    mins  39644;    Therapeutic Activity:     12     mins  16855;     Gait Training:      -     mins  46832;     Ultrasound:     -     mins  27664;    Iontophoresis                 -     mins 04313    Untimed:  Electrical Stimulation:    15     mins  94761 ( );  Mech traction                   -     mins 68965  Dry Needling                  -     Min self pay    Timed Treatment:   55   mins direct  Total Treatment:     70   mins      Kalani Escamilla PT  Physical Therapist  [place] License # [id number]

## 2021-06-18 ENCOUNTER — TREATMENT (OUTPATIENT)
Dept: PHYSICAL THERAPY | Facility: CLINIC | Age: 76
End: 2021-06-18

## 2021-06-18 DIAGNOSIS — M17.12 PRIMARY OSTEOARTHRITIS OF LEFT KNEE: ICD-10-CM

## 2021-06-18 DIAGNOSIS — M25.562 CHRONIC PAIN OF LEFT KNEE: ICD-10-CM

## 2021-06-18 DIAGNOSIS — M17.11 PRIMARY OSTEOARTHRITIS OF RIGHT KNEE: ICD-10-CM

## 2021-06-18 DIAGNOSIS — Z96.652 S/P TOTAL KNEE ARTHROPLASTY, LEFT: Primary | ICD-10-CM

## 2021-06-18 DIAGNOSIS — R26.2 DIFFICULTY WALKING DUE TO KNEE JOINT: ICD-10-CM

## 2021-06-18 DIAGNOSIS — G89.29 CHRONIC PAIN OF LEFT KNEE: ICD-10-CM

## 2021-06-18 DIAGNOSIS — R29.898 DIFFICULTY IN WEIGHT BEARING: ICD-10-CM

## 2021-06-18 PROCEDURE — 97110 THERAPEUTIC EXERCISES: CPT | Performed by: PHYSICAL THERAPIST

## 2021-06-18 PROCEDURE — G0283 ELEC STIM OTHER THAN WOUND: HCPCS | Performed by: PHYSICAL THERAPIST

## 2021-06-18 PROCEDURE — 97530 THERAPEUTIC ACTIVITIES: CPT | Performed by: PHYSICAL THERAPIST

## 2021-06-18 NOTE — PROGRESS NOTES
Physical Therapy Daily Progress Note        Patient: Komal Sheffield   : 1945  Diagnosis/ICD-10 Code:  S/P total knee arthroplasty, left [Z96.652]  Referring practitioner: SABI Scruggs  Date of Initial Visit: Type: THERAPY  Noted: 5/10/2021  Today's Date: 2021  Patient seen for 11 sessions         Komal Sheffield reports: no problems since last session.  No pain this date.        Subjective     Objective   See Exercise, Manual, and Modality Logs for complete treatment.       Assessment/Plan  Pt tolerated exercises well and was able to progress step up and down height however some difficulty/discomfort with step down progression thus will monitor and modify if necessary.  Monitored all exercises and provided with cues as appropriate.  Will continue to monitor tolerance and progress as able towards goals.    Progress per Plan of Care           Manual Therapy:    8     mins  60893;  Therapeutic Exercise:    27     mins  82128;     Neuromuscular Fina:        mins  65415;    Therapeutic Activity:     15     mins  26485;     Gait Training:           mins  29206;     Ultrasound:          mins  59967;    Electrical Stimulation:    15     mins  20480 ( );  Dry Needling          mins self-pay    Timed Treatment:   50   mins   Total Treatment:     80   mins    Sulma Duncan PTA  Physical Therapist Assistant

## 2021-06-21 ENCOUNTER — TREATMENT (OUTPATIENT)
Dept: PHYSICAL THERAPY | Facility: CLINIC | Age: 76
End: 2021-06-21

## 2021-06-21 DIAGNOSIS — Z96.652 S/P TOTAL KNEE ARTHROPLASTY, LEFT: Primary | ICD-10-CM

## 2021-06-21 DIAGNOSIS — G89.29 CHRONIC PAIN OF LEFT KNEE: ICD-10-CM

## 2021-06-21 DIAGNOSIS — R26.2 DIFFICULTY WALKING DUE TO KNEE JOINT: ICD-10-CM

## 2021-06-21 DIAGNOSIS — M25.562 CHRONIC PAIN OF LEFT KNEE: ICD-10-CM

## 2021-06-21 DIAGNOSIS — M17.12 PRIMARY OSTEOARTHRITIS OF LEFT KNEE: ICD-10-CM

## 2021-06-21 PROCEDURE — 97110 THERAPEUTIC EXERCISES: CPT | Performed by: PHYSICAL THERAPIST

## 2021-06-21 PROCEDURE — G0283 ELEC STIM OTHER THAN WOUND: HCPCS | Performed by: PHYSICAL THERAPIST

## 2021-06-21 PROCEDURE — 97530 THERAPEUTIC ACTIVITIES: CPT | Performed by: PHYSICAL THERAPIST

## 2021-06-21 NOTE — PROGRESS NOTES
Physical Therapy Daily Progress Note    Time In ***  Time Out ***    Visit # : 12  Komal Sheffield reports: ***    Subjective     Objective   See Exercise, Manual, and Modality Logs for complete treatment.       Assessment/Plan    {AMB PT PLAN (SOAP Note):20623}           Manual Therapy:    ***     mins  06930;  Therapeutic Exercise:    ***     mins  27935;     Neuromuscular Fina:    ***    mins  86256;    Therapeutic Activity:     ***     mins  52608;     Gait Training:      ***     mins  84272;     Ultrasound:     ***     mins  93413;    Electrical Stimulation:    ***     mins  63407 ( );  Dry Needling     ***     mins self-pay    Timed Treatment:   ***   mins   Total Treatment:     ***   mins    Alondra Burris, PT  Physical Therapist

## 2021-06-21 NOTE — PROGRESS NOTES
Physical Therapy Progress Note  6/21/2021  Carlos A Sanders APRN    Re: Komal Sheffield    ________________________________________________________________    Mrs. Komal Sheffield, Patient seen for 12 sessions.  Treatment has consisted of: PROM, AAROM, AROM, RROM, patellar mobs, posterior femur and tibia mobs, knee flex and ext stretching, balance, proprioception exercises, gait training, pt has been instructed in a HEP, and pt ends treatment with IFC and cold packs.       S: Mrs. Komal Sheffield states: she does not have any pain with her normal daily activities.  Pt states she is going to start back with her water aerobics.      Subjective     Objective          Palpation     Additional Palpation Details  Pt denies having any point tenderness over left medial or lateral knee joint     Active Range of Motion   Left Knee   Flexion: 122 degrees   Extension: 0 degrees     Additional Active Range of Motion Details  Prior to stretching left knee ext 2 degrees, post stretching 0 degrees     Strength/Myotome Testing     Left Hip   Planes of Motion   Flexion: 5  Abduction: 5  Adduction: 4+    Left Knee   Flexion: 5  Extension: 5    Ambulation     Observational Gait   Gait: within functional limits   Walking speed, stride length, left stance time, left swing time and left step length within functional limits.   Left foot contact pattern: heel to toe    Additional Observational Gait Details  Pt ambulates into clinic without and AD or antalgic gait left LE.        See Exercise, Manual, and Modality Logs for complete treatment.       Assessment & Plan     Assessment  Assessment details: Pt is doing very well with good ROM and strength.  Pt enters department without an antalgic gait getting good heel strike to toe off gait pattern.  Pt tolerates all exercises and stretches with minimal pain or discomfort.  Pt still has some hip add weakness, but it will continue to improve as pt continues to do her HEP.  Feel at this time pt could be  discharged to a [facility], but please advise.          Anticipate DC next Visit           Manual Therapy:    6     mins  05304;  Therapeutic Exercise:   20      mins  36402;     Neuromuscular Fina:        mins  55311;    Therapeutic Activity:    10      mins  24486;     Gait Training:           mins  02655;     Ultrasound:          mins  54465;    Electrical Stimulation:    15     mins  94783 ( );  Dry Needling          mins self-pay    Timed Treatment:  36    mins   Total Treatment:     69   mins    Alondra Burris, PT  Physical Therapist

## 2021-06-24 ENCOUNTER — OFFICE VISIT (OUTPATIENT)
Dept: ORTHOPEDIC SURGERY | Facility: CLINIC | Age: 76
End: 2021-06-24

## 2021-06-24 VITALS — RESPIRATION RATE: 18 BRPM | BODY MASS INDEX: 36.14 KG/M2 | TEMPERATURE: 96 F | HEIGHT: 63 IN | WEIGHT: 204 LBS

## 2021-06-24 DIAGNOSIS — R52 PAIN: Primary | ICD-10-CM

## 2021-06-24 DIAGNOSIS — Z96.652 S/P TKR (TOTAL KNEE REPLACEMENT), LEFT: ICD-10-CM

## 2021-06-24 PROCEDURE — 73562 X-RAY EXAM OF KNEE 3: CPT | Performed by: NURSE PRACTITIONER

## 2021-06-24 PROCEDURE — 99024 POSTOP FOLLOW-UP VISIT: CPT | Performed by: NURSE PRACTITIONER

## 2021-06-24 RX ORDER — OMEPRAZOLE 40 MG/1
CAPSULE, DELAYED RELEASE ORAL
COMMUNITY
Start: 2021-05-27

## 2021-06-24 NOTE — PROGRESS NOTES
Komal Sheffield : 1945 MRN: [id number] DATE: 2021    DIAGNOSIS: 8 week follow up left total knee      SUBJECTIVE:Patient returns today for 8 week follow up of left total knee replacement. Patient reports doing well with no unusual complaints. Appears to be progressing appropriately.    OBJECTIVE:   Exam:. The incision is well healed. No sign of infection. Range of motion is measured at 0 to 125. The calf is soft and nontender with a negative Homans sign. Strength is progressing and the patient is ambulating appropriately.    DIAGNOSTIC STUDIES  Xrays: 3 views of the left knee (AP, lateral, and sunrise) were ordered and reviewed for evaluation of recent knee replacement. They demonstrate a well positioned, well aligned knee replacement without complicating factors noted. In comparison with previous films there has been no change.    ASSESSMENT: 8 week status post left knee replacement.    PLAN: 1) Continue with PT exercises as prescribed   2) Follow up 10 months    SABI Scruggs  2021

## 2021-06-25 ENCOUNTER — TELEPHONE (OUTPATIENT)
Dept: PHYSICAL THERAPY | Facility: CLINIC | Age: 76
End: 2021-06-25

## 2021-07-20 ENCOUNTER — TELEPHONE (OUTPATIENT)
Dept: ORTHOPEDIC SURGERY | Facility: CLINIC | Age: 76
End: 2021-07-20

## 2021-07-20 NOTE — TELEPHONE ENCOUNTER
----- Message from Valerio Barakat MD sent at 7/19/2021  8:45 PM EDT -----  Regarding: FW: Complaint  Contact: [phone]      ----- Message -----  From: Kathleen Rojo MA  Sent: 7/19/2021   9:40 AM EDT  To: Valerio Barakat MD  Subject: FW: Complaint                                      ----- Message -----  From: Komal Sheffield  Sent: 7/17/2021   9:34 AM EDT  To: Maribel Os Lbj [facility]  Subject: Complaint                                        Hi Carlos A!  My knee is still throbbing.  Could you check it for me to give me some peace of mind?    Komal Sheffield

## 2021-07-22 ENCOUNTER — OFFICE VISIT (OUTPATIENT)
Dept: ORTHOPEDIC SURGERY | Facility: CLINIC | Age: 76
End: 2021-07-22

## 2021-07-22 VITALS — BODY MASS INDEX: 36.86 KG/M2 | WEIGHT: 208 LBS | HEIGHT: 63 IN

## 2021-07-22 DIAGNOSIS — Z96.651 STATUS POST TOTAL RIGHT KNEE REPLACEMENT: ICD-10-CM

## 2021-07-22 DIAGNOSIS — R52 PAIN: Primary | ICD-10-CM

## 2021-07-22 DIAGNOSIS — Z96.652 S/P TKR (TOTAL KNEE REPLACEMENT), LEFT: ICD-10-CM

## 2021-07-22 PROCEDURE — 73562 X-RAY EXAM OF KNEE 3: CPT | Performed by: NURSE PRACTITIONER

## 2021-07-22 PROCEDURE — 99212 OFFICE O/P EST SF 10 MIN: CPT | Performed by: NURSE PRACTITIONER

## 2021-07-22 NOTE — PROGRESS NOTES
"Komal Sheffield : 1945 MRN: [id number] DATE: 2021    Chief Complaint:  Follow up left total knee  / right total knee     SUBJECTIVE:Patient returns today for a general follow up of bilateral total knee replacement. Patient reports she was at the pool and jumped into the pool landing on the concrete floor.  Patient states when she had the concrete floor she felt a jarring sensation and has had some slight discomfort ever since then.  Patient states this was approximately 2 weeks ago.  She denies any significant pain or complications from this issue.  Patient states she just wants to be evaluated for peace of mind to know that everything with her knee replacements is okay.  Patient is ambulatory full weightbearing walks unassisted in clinic today.    OBJECTIVE:    Ht 160 cm (63\")   Wt 94.3 kg (208 lb)   BMI 36.85 kg/m²   Family History   Problem Relation Age of Onset   • Cancer Maternal Grandmother    • Diabetes Maternal Grandmother    • Malig Hyperthermia Neg Hx      Past Medical History:   Diagnosis Date   • Arthritis    • Cancer (CMS/HCC)     Breast CA 2016 with a mastectomy    • Depression    • Diabetes mellitus (CMS/HCC)    • History of kidney stones    • Hyperlipidemia    • Hypertension    • Knee pain, bilateral    • Knee swelling    • Osteopenia      Past Surgical History:   Procedure Laterality Date   • BREAST SURGERY Right 2016     mastectomy with lymph node disection as well as reconstruction latet   • CHOLECYSTECTOMY     • COLONOSCOPY     • CYSTOSCOPY BLADDER BIOPSY     • HYSTERECTOMY     • JOINT REPLACEMENT  21   • KIDNEY STONE SURGERY      multiple    • LYMPH NODE DISSECTION Right     breast   • MASTECTOMY Right    • TONSILLECTOMY     • TOTAL KNEE ARTHROPLASTY Right 2018    Procedure: RT TOTAL KNEE ARTHROPLASTY;  Surgeon: Valerio Barakat MD;  Location: [facility];  Service: Orthopedics   • TOTAL KNEE ARTHROPLASTY Left 2021    Procedure: TOTAL KNEE ARTHROPLASTY;  Surgeon: " Valerio Barakat MD;  Location: [facility] OR;  Service: Orthopedics;  Laterality: Left;     Social History     Socioeconomic History   • Marital status:      Spouse name: Not on file   • Number of children: Not on file   • Years of education: Not on file   • Highest education level: Not on file   Tobacco Use   • Smoking status: Never Smoker   • Smokeless tobacco: Never Used   Vaping Use   • Vaping Use: Never used   Substance and Sexual Activity   • Alcohol use: Never   • Drug use: Never   • Sexual activity: Not Currently     Partners: Male       Review of Systems: 14 point review of systems performed pertinent positives and negatives discussed above, all other systems are negative    Exam:. Bilateral knee exam - The incisions are well healed. Range of motion is measured at 3 to 125. The calf is soft and nontender with a negative Homans sign. Alignment is neutral. Good quad strength. There is no evidence of varus/valgus or flexion instability. No effusion. Intact to light touch with palpable distal pulses.     DIAGNOSTIC STUDIES  Xrays: 3 views (AP bilateral knees, lateral bilateral, and sunrise bilateral knees) were ordered and reviewed for evaluation of left knee replacement. They demonstrate a well positioned, well aligned knee replacement without complicating factors noted. In comparison with previous films there has been no change.    ASSESSMENT:    Follow up left knee replacement.        PLAN:    After evaluation the patient's knees are mechanically sound.  She has full range of motion with no irregularities.  X-rays show no irregularities.  Patient can continue activities as tolerated.  Follow-up with us on an as-needed basis.    Carlos A Sanders, SABI  7/22/2021

## 2021-09-27 ENCOUNTER — TELEPHONE (OUTPATIENT)
Dept: ORTHOPEDIC SURGERY | Facility: CLINIC | Age: 76
End: 2021-09-27

## 2021-09-27 NOTE — TELEPHONE ENCOUNTER
Provider: DR JEOVANY SCOTT     Caller: DIANA AHMADI     Relationship to Patient: SELF     Pharmacy: [id number]    Phone Number: [phone]    Reason for Call: PT HAD L KNEE SX APRIL 2021 , PT IS GOING FOR DENTAL CLEANING 10.5.21  , AND CALLED IN DUE TO PAPERWORK STATING ANY DENTAL WORK/CLEANING SHE WOULD NEED MEDICATION -  PLEASE ADVISE

## 2021-09-28 RX ORDER — CEPHALEXIN 500 MG/1
CAPSULE ORAL
Qty: 4 CAPSULE | Refills: 5 | Status: SHIPPED | OUTPATIENT
Start: 2021-09-28 | End: 2022-04-26

## 2021-09-28 NOTE — TELEPHONE ENCOUNTER
Please notify the patient that prophylactic antibiotics for their dental appointment has been sent to the pharmacy per the request

## 2021-09-28 NOTE — TELEPHONE ENCOUNTER
Patient informed of CAR Prior message verbalizing understanding and with no further questions or concerns.

## 2022-04-26 ENCOUNTER — OFFICE VISIT (OUTPATIENT)
Dept: ORTHOPEDIC SURGERY | Facility: CLINIC | Age: 77
End: 2022-04-26

## 2022-04-26 VITALS — HEIGHT: 63 IN | BODY MASS INDEX: 36.32 KG/M2 | WEIGHT: 205 LBS | TEMPERATURE: 97.1 F

## 2022-04-26 DIAGNOSIS — Z96.651 STATUS POST RIGHT KNEE REPLACEMENT: Primary | ICD-10-CM

## 2022-04-26 PROCEDURE — 99213 OFFICE O/P EST LOW 20 MIN: CPT | Performed by: ORTHOPAEDIC SURGERY

## 2022-04-26 PROCEDURE — 73562 X-RAY EXAM OF KNEE 3: CPT | Performed by: ORTHOPAEDIC SURGERY

## 2022-04-26 RX ORDER — METFORMIN HYDROCHLORIDE 500 MG/1
2 TABLET, EXTENDED RELEASE ORAL
COMMUNITY
Start: 2022-04-15

## 2022-04-26 RX ORDER — OMEPRAZOLE 40 MG/1
1 CAPSULE, DELAYED RELEASE ORAL DAILY
COMMUNITY
Start: 2022-03-30

## 2022-04-26 NOTE — PROGRESS NOTES
Patient: Komal Sheffield  YOB: 1945 76 y.o. female  Medical Record Number: [id number]    Chief Complaints:   Chief Complaint   Patient presents with   • Left Knee - Follow-up       History of Present Illness:Komal Sheffield is a 76 y.o. female who presents for follow-up of both knees.  She has bilateral total knee replacements has mild soreness about the anterior aspect of the right knee.  The left was most recent which was done a year ago and it is doing fine without problems    Allergies:   Allergies   Allergen Reactions   • Cholestatin Unknown (See Comments) and Unknown - Low Severity     Pt is unsure of reaction, dr told her to never eat it.  Other reaction(s): Unknown (See Comments)  Pt is unsure of reaction, dr told her to never eat it.   • Shellfish-Derived Products Other (See Comments)     LOBSTER PER ALLERGY TESTING   • Atorvastatin Itching, Other (See Comments) and Myalgia     Muscle pain   • Ciprofloxacin Itching   • Neomycin Itching   • Nsaids Other (See Comments)     High creatinine         Medications:   Current Outpatient Medications   Medication Sig Dispense Refill   • acetaminophen (TYLENOL) 500 MG tablet Take 1,000 mg by mouth Every 6 (Six) Hours As Needed for Mild Pain .     • cetirizine (zyrTEC) 10 MG tablet Take 10 mg by mouth Every Night.     • clobetasol (TEMOVATE) 0.05 % cream Apply 1 application topically to the appropriate area as directed As Needed (irritation genitals).     • fluticasone (CUTIVATE) 0.005 % ointment Apply 1 application topically to the appropriate area as directed As Needed (irritation of genitals).     • fluticasone (FLONASE) 50 MCG/ACT nasal spray 2 sprays into the nostril(s) as directed by provider Every Morning.     • metFORMIN (GLUCOPHAGE) 1000 MG tablet Take 1,000 mg by mouth Daily With Breakfast.     • metFORMIN ER (GLUCOPHAGE-XR) 500 MG 24 hr tablet Take 2 tablets by mouth Daily With Breakfast.     • omeprazole (priLOSEC) 40 MG capsule      • omeprazole  "(priLOSEC) 40 MG capsule Take 1 capsule by mouth Daily.     • ondansetron ODT (ZOFRAN-ODT) 4 MG disintegrating tablet Take 4 mg by mouth.     • Potassium Citrate ER 15 MEQ (1620 MG) tablet controlled-release Take 1 tablet by mouth Daily. Pt unsure of dosage     • pravastatin (PRAVACHOL) 10 MG tablet Take 10 mg by mouth Every Night.     • sertraline (ZOLOFT) 50 MG tablet Take 50 mg by mouth Daily.     • valsartan-hydrochlorothiazide (DIOVAN-HCT) 80-12.5 MG per tablet Take 1 tablet by mouth Daily.       No current facility-administered medications for this visit.     Facility-Administered Medications Ordered in Other Visits   Medication Dose Route Frequency Provider Last Rate Last Admin   • Chlorhexidine Gluconate 2 % pads 2 each  2 pad Apply externally BID Katina Dale APRN       • mupirocin (BACTROBAN) 2 % nasal ointment   Nasal BID Valerio Barakat MD             The following portions of the patient's history were reviewed and updated as appropriate: allergies, current medications, past family history, past medical history, past social history, past surgical history and problem list.    Review of Systems:   A 14 point review of systems was performed. All systems negative except pertinent positives/negative listed in HPI above    Physical Exam:   Vitals:    04/26/22 1407   Temp: 97.1 °F (36.2 °C)   Weight: 93 kg (205 lb)   Height: 160 cm (63\")       General: A and O x 3, ASA, NAD    SCLERA:    Normal    DENTITION:   Normal  Knee:  bilateral    ALIGNMENT:     Neutral  ,   Patella  tracks  Midline without crepitance    GAIT:    Nonantalgic    SKIN:    Well healed midline incision, no erythema or fluctuance    RANGE OF MOTION:   0-120   DEG    STRENGTH:   5  / 5    LIGAMENTS:    No varus / valgus instability.   No  Flexion   instability.       DISTAL PULSES:    Paplable    DISTAL SENSATION :   Intact    LYMPHATICS:     No   lymphadenopathy    OTHER:     No   Effusion      Calf soft / nontender ,   Negative Vijay's " sign              Radiology:  Xrays 3views left knee(ap,lateral, sunrise) were ordered and reviewed for evaluation of knee pain demonstratinga well positioned knee replacement without evidence of wear, loosening or osteolysis  todays xrays were compared to previous xrays and demonstrate no change.  There is a well-positioned right total knee without complicating factors noted    Assessment/Plan:  Bilateral total knees overall both are doing well has some mild anterior tendinitis or Pez anserine bursitis.  I gave her a prescription for an anti-inflammatory gel which she can apply twice daily and call me back as needed.

## (undated) DEVICE — GLV SURG SENSICARE PI MIC PF SZ7 LF STRL

## (undated) DEVICE — SUT VIC 0 CT1 36IN J946H

## (undated) DEVICE — SOL NACL 0.9PCT 100ML SGL

## (undated) DEVICE — APPL DURAPREP IODOPHOR APL 26ML

## (undated) DEVICE — GLV SURG SENSICARE MICRO PF LF 7 STRL

## (undated) DEVICE — STERILE PATIENT PROTECTIVE PAD FOR IMP® KNEE POSITIONERS & COHESIVE WRAP (10 / CASE): Brand: DE MAYO KNEE POSITIONER®

## (undated) DEVICE — PREMIUM WET SKIN PREP TRAY: Brand: MEDLINE INDUSTRIES, INC.

## (undated) DEVICE — MEDI-VAC YANKAUER SUCTION HANDLE W/BULBOUS TIP: Brand: CARDINAL HEALTH

## (undated) DEVICE — GLV SURG SENSICARE W/ALOE PF LF 8 STRL

## (undated) DEVICE — GLV SURG SENSICARE W/ALOE PF LF 7.5 STRL

## (undated) DEVICE — STPLR SKIN VISISTAT WD 35CT

## (undated) DEVICE — DUAL CUT SAGITTAL BLADE

## (undated) DEVICE — SUT VIC 1 CT1 36IN J947H

## (undated) DEVICE — GLV SURG PREMIERPRO ORTHO LTX PF SZ7.5 BRN

## (undated) DEVICE — 3M™ IOBAN™ 2 ANTIMICROBIAL INCISE DRAPE 6640EZ: Brand: IOBAN™ 2

## (undated) DEVICE — PK KN TOTL 40

## (undated) DEVICE — MAT FLR ABSORBENT LG 4FT 10 2.5FT

## (undated) DEVICE — PENCL E/S ULTRAVAC TELESCP NOSE HOLSTR 10FT

## (undated) DEVICE — GLV SURG SENSICARE PI PF LF 7 GRN STRL

## (undated) DEVICE — PREP SOL POVIDONE/IODINE BT 4OZ

## (undated) DEVICE — ENCORE® LATEX ORTHO SIZE 7.5, STERILE LATEX POWDER-FREE SURGICAL GLOVE: Brand: ENCORE

## (undated) DEVICE — BNDG ELAS ELITE V/CLOSE 6IN 5YD LF STRL

## (undated) DEVICE — SYS CLS SKIN PREMIERPRO EXOFINFUSION 22CM

## (undated) DEVICE — UNDERCAST PADDING: Brand: DEROYAL

## (undated) DEVICE — OPTIFOAM GENTLE SA, POSTOP, 4X12: Brand: MEDLINE

## (undated) DEVICE — TRAP FLD MINIVAC MEGADYNE 100ML

## (undated) DEVICE — KT DRN EVAC WND PVC PCH WTROC RND 10F400

## (undated) DEVICE — DRSNG SURESITE WNDW 2.38X2.75

## (undated) DEVICE — NEEDLE, QUINCKE 22GX3.5": Brand: MEDLINE INDUSTRIES, INC.

## (undated) DEVICE — NDL SPINE 22G 31/2IN BLK